# Patient Record
Sex: MALE | Race: WHITE | Employment: OTHER | URBAN - METROPOLITAN AREA
[De-identification: names, ages, dates, MRNs, and addresses within clinical notes are randomized per-mention and may not be internally consistent; named-entity substitution may affect disease eponyms.]

---

## 2017-10-03 PROBLEM — I77.9 BILATERAL CAROTID ARTERY DISEASE (HCC): Status: ACTIVE | Noted: 2017-10-03

## 2020-01-21 RX ORDER — SODIUM CHLORIDE 0.9 % (FLUSH) 0.9 %
5-40 SYRINGE (ML) INJECTION EVERY 8 HOURS
Status: CANCELLED | OUTPATIENT
Start: 2020-01-21

## 2020-01-21 RX ORDER — SODIUM CHLORIDE 0.9 % (FLUSH) 0.9 %
5-40 SYRINGE (ML) INJECTION AS NEEDED
Status: CANCELLED | OUTPATIENT
Start: 2020-01-21

## 2020-01-27 ENCOUNTER — ANESTHESIA EVENT (OUTPATIENT)
Dept: SURGERY | Age: 85
End: 2020-01-27
Payer: MEDICARE

## 2020-01-27 NOTE — H&P
Date of Service: 2020-01-20  Work Status:  ?????     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 He is here now because of a cyst on the dorsal aspect of the DIP joint region of his right index finger. The cyst has been present for about 9 months and continues to gradually enlarge. Otherwise it does not hurt, and it has not been ruptured or drained. He has had no locking, triggering or catching of the fingers. The patient does have a bovine aortic valve. He denies diabetes. PMH, SH & ROS:  This form has been filled out, reviewed, signed and placed in the patient's chart. I reviewed pertinent positive and negative responses and tried to associate them with the musculoskeletal problem of the patient. Other positive responses were deferred to the patient's primary care physician. The patient has several positive findings on the review of systems. He does not smoke or drink. He has had various surgeries including prostate removal, hip replacement and open heart surgery that was done at [facility] in [place]. He has high blood pressure, sleep apnea and uses CPAP, heartburn and kidney disease. CURRENT MEDS:  Listed. ALLERGIES:   None known. PE:  The patient is an alert, oriented, elderly, white male accompanied by his wife. Exam of the right wrist and hand shows functional range of motion of the wrist with no joint swelling or effusion. His hand looks normal except for some mild degenerative arthritic changes around the DIP joints and a fairly large 6 mm to 7 mm cyst over the dorsal radial aspect of the distal segment of his index finger with grooving of the nail distal to the cyst.  The cyst has very thin skin. He has active flexion and extension of the DIP joint of the index finger as well as his other digits. He has no triggering or locking of the fingers.   Circulation is good with brisk capillary refill in the fingertips. Neurologic exam is intact to light touch in the radial, ulnar and median nerve distribution. Skin and nails are normal other than the cyst.     DIAGNOSIS:  Mucous cyst of the right index finger. DISPOSITION:  The problem was discussed with the patient and his wife. I have given him the option of treating this carefully and trying to avoid rupturing it or getting an infection. He is somewhat fearful of infections because of his valve. His other option is surgical excision of the cyst.  He wishes to go ahead with the excision. He will be scheduled for that as an outpatient under an IV regional anesthetic or possibly even a digital block.

## 2020-01-28 ENCOUNTER — HOSPITAL ENCOUNTER (OUTPATIENT)
Age: 85
Setting detail: OUTPATIENT SURGERY
Discharge: HOME | End: 2020-01-28
Attending: ORTHOPAEDIC SURGERY | Admitting: ORTHOPAEDIC SURGERY
Payer: MEDICARE

## 2020-01-28 ENCOUNTER — ANESTHESIA (OUTPATIENT)
Dept: SURGERY | Age: 85
End: 2020-01-28
Payer: MEDICARE

## 2020-01-28 VITALS
HEART RATE: 62 BPM | TEMPERATURE: 98 F | RESPIRATION RATE: 16 BRPM | SYSTOLIC BLOOD PRESSURE: 124 MMHG | OXYGEN SATURATION: 96 % | DIASTOLIC BLOOD PRESSURE: 61 MMHG | WEIGHT: 210 LBS | BODY MASS INDEX: 30.13 KG/M2

## 2020-01-28 DIAGNOSIS — G89.18 POST-OP PAIN: Primary | ICD-10-CM

## 2020-01-28 PROCEDURE — 76210000063 HC OR PH I REC FIRST 0.5 HR: Performed by: ORTHOPAEDIC SURGERY

## 2020-01-28 PROCEDURE — 77030010507 HC ADH SKN DERMBND J&J -B: Performed by: ORTHOPAEDIC SURGERY

## 2020-01-28 PROCEDURE — 76010000160 HC OR TIME 0.5 TO 1 HR INTENSV-TIER 1: Performed by: ORTHOPAEDIC SURGERY

## 2020-01-28 PROCEDURE — 74011250636 HC RX REV CODE- 250/636: Performed by: REGISTERED NURSE

## 2020-01-28 PROCEDURE — 74011000250 HC RX REV CODE- 250: Performed by: ANESTHESIOLOGY

## 2020-01-28 PROCEDURE — 76210000020 HC REC RM PH II FIRST 0.5 HR: Performed by: ORTHOPAEDIC SURGERY

## 2020-01-28 PROCEDURE — 76060000032 HC ANESTHESIA 0.5 TO 1 HR: Performed by: ORTHOPAEDIC SURGERY

## 2020-01-28 PROCEDURE — 77030000032 HC CUF TRNQT ZIMM -B: Performed by: ORTHOPAEDIC SURGERY

## 2020-01-28 PROCEDURE — 77030018836 HC SOL IRR NACL ICUM -A: Performed by: ORTHOPAEDIC SURGERY

## 2020-01-28 PROCEDURE — 74011250636 HC RX REV CODE- 250/636: Performed by: ANESTHESIOLOGY

## 2020-01-28 PROCEDURE — 74011000250 HC RX REV CODE- 250: Performed by: ORTHOPAEDIC SURGERY

## 2020-01-28 PROCEDURE — 74011250636 HC RX REV CODE- 250/636: Performed by: ORTHOPAEDIC SURGERY

## 2020-01-28 PROCEDURE — 77030031139 HC SUT VCRL2 J&J -A: Performed by: ORTHOPAEDIC SURGERY

## 2020-01-28 PROCEDURE — 77030002916 HC SUT ETHLN J&J -A: Performed by: ORTHOPAEDIC SURGERY

## 2020-01-28 RX ORDER — SODIUM CHLORIDE, SODIUM LACTATE, POTASSIUM CHLORIDE, CALCIUM CHLORIDE 600; 310; 30; 20 MG/100ML; MG/100ML; MG/100ML; MG/100ML
100 INJECTION, SOLUTION INTRAVENOUS CONTINUOUS
Status: DISCONTINUED | OUTPATIENT
Start: 2020-01-28 | End: 2020-01-28 | Stop reason: HOSPADM

## 2020-01-28 RX ORDER — LIDOCAINE HYDROCHLORIDE 5 MG/ML
INJECTION, SOLUTION INFILTRATION; INTRAVENOUS AS NEEDED
Status: DISCONTINUED | OUTPATIENT
Start: 2020-01-28 | End: 2020-01-28

## 2020-01-28 RX ORDER — HYDROCODONE BITARTRATE AND ACETAMINOPHEN 7.5; 325 MG/1; MG/1
1 TABLET ORAL
Qty: 20 TAB | Refills: 0 | Status: SHIPPED | OUTPATIENT
Start: 2020-01-28 | End: 2020-02-01

## 2020-01-28 RX ORDER — LIDOCAINE HYDROCHLORIDE 5 MG/ML
INJECTION, SOLUTION INFILTRATION; INTRAVENOUS
Status: COMPLETED | OUTPATIENT
Start: 2020-01-28 | End: 2020-01-28

## 2020-01-28 RX ORDER — OXYCODONE HYDROCHLORIDE 5 MG/1
10 TABLET ORAL
Status: DISCONTINUED | OUTPATIENT
Start: 2020-01-28 | End: 2020-01-28 | Stop reason: HOSPADM

## 2020-01-28 RX ORDER — PROPOFOL 10 MG/ML
INJECTION, EMULSION INTRAVENOUS
Status: DISCONTINUED | OUTPATIENT
Start: 2020-01-28 | End: 2020-01-28 | Stop reason: HOSPADM

## 2020-01-28 RX ORDER — SODIUM CHLORIDE 0.9 % (FLUSH) 0.9 %
5-40 SYRINGE (ML) INJECTION EVERY 8 HOURS
Status: DISCONTINUED | OUTPATIENT
Start: 2020-01-28 | End: 2020-01-28 | Stop reason: HOSPADM

## 2020-01-28 RX ORDER — SODIUM CHLORIDE 0.9 % (FLUSH) 0.9 %
5-40 SYRINGE (ML) INJECTION AS NEEDED
Status: DISCONTINUED | OUTPATIENT
Start: 2020-01-28 | End: 2020-01-28 | Stop reason: HOSPADM

## 2020-01-28 RX ORDER — HYDROMORPHONE HYDROCHLORIDE 2 MG/ML
0.5 INJECTION, SOLUTION INTRAMUSCULAR; INTRAVENOUS; SUBCUTANEOUS
Status: DISCONTINUED | OUTPATIENT
Start: 2020-01-28 | End: 2020-01-28 | Stop reason: HOSPADM

## 2020-01-28 RX ORDER — LIDOCAINE HYDROCHLORIDE AND EPINEPHRINE 10; 10 MG/ML; UG/ML
INJECTION, SOLUTION INFILTRATION; PERINEURAL AS NEEDED
Status: DISCONTINUED | OUTPATIENT
Start: 2020-01-28 | End: 2020-01-28 | Stop reason: HOSPADM

## 2020-01-28 RX ORDER — MIDAZOLAM HYDROCHLORIDE 1 MG/ML
2 INJECTION, SOLUTION INTRAMUSCULAR; INTRAVENOUS
Status: DISCONTINUED | OUTPATIENT
Start: 2020-01-28 | End: 2020-01-28 | Stop reason: HOSPADM

## 2020-01-28 RX ORDER — CEFAZOLIN SODIUM/WATER 2 G/20 ML
2 SYRINGE (ML) INTRAVENOUS ONCE
Status: COMPLETED | OUTPATIENT
Start: 2020-01-28 | End: 2020-01-28

## 2020-01-28 RX ORDER — LIDOCAINE HYDROCHLORIDE 10 MG/ML
0.3 INJECTION INFILTRATION; PERINEURAL ONCE
Status: DISCONTINUED | OUTPATIENT
Start: 2020-01-28 | End: 2020-01-28 | Stop reason: HOSPADM

## 2020-01-28 RX ADMIN — Medication 2 G: at 08:39

## 2020-01-28 RX ADMIN — PROPOFOL 50 MCG/KG/MIN: 10 INJECTION, EMULSION INTRAVENOUS at 08:32

## 2020-01-28 RX ADMIN — SODIUM CHLORIDE, SODIUM LACTATE, POTASSIUM CHLORIDE, AND CALCIUM CHLORIDE 100 ML/HR: 600; 310; 30; 20 INJECTION, SOLUTION INTRAVENOUS at 07:30

## 2020-01-28 RX ADMIN — LIDOCAINE HYDROCHLORIDE 40 ML: 5 INJECTION, SOLUTION INFILTRATION; INTRAVENOUS at 08:36

## 2020-01-28 NOTE — DISCHARGE INSTRUCTIONS
POST OP INSTRUCTIONS      1. Patient has appointment for 2/7/20 at 9:20 AM at the [facility]. 2.  For problems call Dr Lissett Baires, [address] [facility],  [phone]          Appointments only,  [phone]    3. Ice and elevate the surgical site. 4.  May remove dressings in 48 hours and then cover the incision with Band-aids. Keep the stitches dry. Anam Brchristal DIET  · Clear liquids until no nausea or vomiting; then light diet for the first day. · Advance to regular diet on second day, unless your doctor orders otherwise. · If nausea and vomiting continues, call your doctor. PAIN  · Take pain medication as directed by your doctor. · Call your doctor if pain is NOT relieved by medication. CALL YOUR DOCTOR IF   · Excessive bleeding that does not stop after holding pressure over the area  · Temperature of 101 degrees F or above  · Excessive redness, swelling or bruising, and/ or green or yellow, smelly discharge from incision    AFTER ANESTHESIA   · For the first 24 hours: DO NOT Drive, Drink alcoholic beverages, or Make important decisions. · Be aware of dizziness following anesthesia and while taking pain medication.          YOUR DOCTOR'S PHONE NUMBER  [phone]      DISCHARGE SUMMARY from Nurse    PATIENT INSTRUCTIONS:    After general anesthesia or intravenous sedation, for 24 hours or while taking prescription Narcotics:  · Limit your activities  · Do not drive and operate hazardous machinery  · Do not make important personal or business decisions  · Do  not drink alcoholic beverages  · If you have not urinated within 8 hours after discharge, please contact your surgeon on call. *  Please give a list of your current medications to your Primary Care Provider. *  Please update this list whenever your medications are discontinued, doses are      changed, or new medications (including over-the-counter products) are added. *  Please carry medication information at all times in case of emergency situations. These are general instructions for a healthy lifestyle:    No smoking/ No tobacco products/ Avoid exposure to second hand smoke    Surgeon General's Warning:  Quitting smoking now greatly reduces serious risk to your health. Obesity, smoking, and sedentary lifestyle greatly increases your risk for illness    A healthy diet, regular physical exercise & weight monitoring are important for maintaining a healthy lifestyle    You may be retaining fluid if you have a history of heart failure or if you experience any of the following symptoms:  Weight gain of 3 pounds or more overnight or 5 pounds in a week, increased swelling in our hands or feet or shortness of breath while lying flat in bed. Please call your doctor as soon as you notice any of these symptoms; do not wait until your next office visit. Recognize signs and symptoms of STROKE:    F-face looks uneven    A-arms unable to move or move unevenly    S-speech slurred or non-existent    T-time-call 911 as soon as signs and symptoms begin-DO NOT go       Back to bed or wait to see if you get better-TIME IS BRAIN.

## 2020-01-28 NOTE — ANESTHESIA PROCEDURE NOTES
Peripheral Block    Start time: 1/28/2020 8:32 AM  End time: 1/28/2020 8:37 AM  Performed by: Romain Perry CRNA  Authorized by: Jen Pacheco MD       Pre-procedure:    Indications: primary anesthetic    Preanesthetic Checklist: patient identified, risks and benefits discussed, site marked, timeout performed, anesthesia consent given and patient being monitored    Timeout Time: 08:32          Block Type:   Block Type:  Aravind block  Laterality:  Right  Patient Position:  Supine  Block Limb IV Checked: Yes    Esmarch exsanguination: Yes    Tourniquet Type:  Single  Tourniquet Location:  Below elbow  Tourniquet Inflated:  1/28/2020 8:35 AM  Inflation (mmHg):  250  Limb w/o Radial Pulse: Yes    Infused Agent:  Lidocaine 0.5%  Volume Infused (mL):  40    Assessment:    Injection Assessment:   Patient tolerance:  Patient tolerated the procedure well with no immediate complications

## 2020-01-28 NOTE — ANESTHESIA PREPROCEDURE EVALUATION
Anesthetic History               Review of Systems / Medical History  Patient summary reviewed and pertinent labs reviewed    Pulmonary        Sleep apnea: CPAP           Neuro/Psych   Within defined limits           Cardiovascular    Hypertension: well controlled        Dysrhythmias : SVT  CAD    Exercise tolerance: >4 METS  Comments: S/p SVT ablation, AVR/CABG x1 vessel, sees cardiologist regularly, no active cardiac conditions   GI/Hepatic/Renal     GERD: well controlled    Renal disease: CRI  PUD     Endo/Other        Obesity and arthritis     Other Findings              Physical Exam    Airway  Mallampati: II  TM Distance: > 6 cm  Neck ROM: normal range of motion   Mouth opening: Normal     Cardiovascular    Rhythm: regular  Rate: normal         Dental    Dentition: Full lower dentures and Full upper dentures     Pulmonary                 Abdominal  GI exam deferred       Other Findings            Anesthetic Plan    ASA: 3  Anesthesia type: regional - [place] block          Induction: Intravenous  Anesthetic plan and risks discussed with: Patient

## 2020-01-28 NOTE — INTERVAL H&P NOTE
H&P Update: 
Jodie Brunson was seen and examined. Pt is alert and oriented. Chest: Clear w/o SOB. C/V:  RR&R History and physical has been reviewed. The patient has been examined. There have been no significant clinical changes since the completion of the originally dated History and Physical. 
Patient identified by surgeon; surgical site was confirmed by patient and surgeon. The patient is here for excision of a mucus cyst of the right index finger.

## 2020-01-28 NOTE — OP NOTES
OPERATIVE NOTE                    EXCISION OF MUCUS CYST    [facility]         1/28/2020    PREOP DIAGNOSIS:  MUCUS CYST OF THE DIP JOINT OF THE RIGHT INDEX FINGER    POSTOP DIAGNOSIS: SAME    PROCEDURE:  EXCISION OF MUCUS CYST OF THE DIP JOINT OF THE RIGHT    INDEX FINGER    SURGEON:  Bob Monreal MD    ANESTHESIA:  Aravind Block    INDICATIONS:  Vinh Prakash is a 82 y.o. male with a mucus cyst of the right index finger . The planned procedure and alternatives for treatment have been discussed previously. Informed consent has been obtained. The operative site has been marked. PROCEDURAL NOTE:   The patient was brought to the OR and anesthetized for the procedure. The patient was positioned in the supine position and bony prominences and nerves padded or protected. The right upper extremity was prepped and draped as a sterile field. A time out was held with the operative team and the patient, procedure, surgical site and surgeon identified. A dorso-radial T-shaped incision was made over the dorsum of the finger with the transverse limb over the extension crease. The skin flaps were elevated off the underlying bone and extensor tendon. The thin walled cyst was excised and then the joint opened along the ulnar side of the extensor tendon insertion. A small rongeur was used to remove the bony osteophyte that was present over the dorsum of the base of the distal phalanx and dorsal margin of the middle phalanx. The skin flaps were reapproximated with 4-0 nylon stitches. A digital block with 6 cc of 1% lidocaine with epinephrine was done at the A1 pulley level at the base of the finger. A sterile dressing of Xeroform, gauze and jaylene wrapping was applied. The tourniquet was released with good return of color to the fingers. The patient was sent to the recovery room in good condition.     TOURNIQUET TIME:   Total Tourniquet Time Documented:  Forearm (Right) - 21 minutes  Total: Forearm (Right) - 21 minutes      SIGNED: Leona Oliveros MD

## 2020-01-28 NOTE — ANESTHESIA POSTPROCEDURE EVALUATION
Procedure(s):  RIGHT INDEX FINGER EXCISION OF MUCOUS CYST.    regional    Anesthesia Post Evaluation      Multimodal analgesia: multimodal analgesia used between 6 hours prior to anesthesia start to PACU discharge  Patient location during evaluation: PACU  Patient participation: complete - patient participated  Level of consciousness: awake and alert  Pain management: adequate  Airway patency: patent  Anesthetic complications: no  Cardiovascular status: acceptable  Respiratory status: acceptable  Hydration status: acceptable  Post anesthesia nausea and vomiting:  none      Vitals Value Taken Time   /62 1/28/2020  9:13 AM   Temp 36.7 °C (98 °F) 1/28/2020  9:05 AM   Pulse 62 1/28/2020  9:15 AM   Resp 16 1/28/2020  9:13 AM   SpO2 97 % 1/28/2020  9:15 AM   Vitals shown include unvalidated device data.

## 2021-04-29 PROBLEM — G47.10 HYPERSOMNIA, UNSPECIFIED: Status: ACTIVE | Noted: 2021-04-29

## 2021-04-29 PROBLEM — Z78.9 DIFFICULTY USING CONTINUOUS POSITIVE AIRWAY PRESSURE (CPAP) FULL FACE MASK: Status: ACTIVE | Noted: 2021-04-29

## 2022-03-19 PROBLEM — G47.10 HYPERSOMNIA, UNSPECIFIED: Status: ACTIVE | Noted: 2021-04-29

## 2022-03-19 PROBLEM — I77.9 BILATERAL CAROTID ARTERY DISEASE (HCC): Status: ACTIVE | Noted: 2017-10-03

## 2022-03-20 PROBLEM — Z78.9 DIFFICULTY USING CONTINUOUS POSITIVE AIRWAY PRESSURE (CPAP) FULL FACE MASK: Status: ACTIVE | Noted: 2021-04-29

## 2022-06-22 ENCOUNTER — OFFICE VISIT (OUTPATIENT)
Dept: CARDIOLOGY CLINIC | Age: 87
End: 2022-06-22
Payer: MEDICARE

## 2022-06-22 VITALS
BODY MASS INDEX: 29.49 KG/M2 | SYSTOLIC BLOOD PRESSURE: 120 MMHG | HEART RATE: 64 BPM | WEIGHT: 206 LBS | DIASTOLIC BLOOD PRESSURE: 82 MMHG | HEIGHT: 70 IN

## 2022-06-22 DIAGNOSIS — I25.10 CORONARY ARTERY DISEASE INVOLVING NATIVE CORONARY ARTERY OF NATIVE HEART WITHOUT ANGINA PECTORIS: ICD-10-CM

## 2022-06-22 DIAGNOSIS — I65.23 BILATERAL CAROTID ARTERY STENOSIS: ICD-10-CM

## 2022-06-22 DIAGNOSIS — I35.1 NONRHEUMATIC AORTIC VALVE INSUFFICIENCY: ICD-10-CM

## 2022-06-22 DIAGNOSIS — I48.0 PAROXYSMAL ATRIAL FIBRILLATION (HCC): Primary | ICD-10-CM

## 2022-06-22 DIAGNOSIS — I10 BENIGN ESSENTIAL HYPERTENSION: ICD-10-CM

## 2022-06-22 PROCEDURE — 99214 OFFICE O/P EST MOD 30 MIN: CPT | Performed by: INTERNAL MEDICINE

## 2022-06-22 PROCEDURE — G8427 DOCREV CUR MEDS BY ELIG CLIN: HCPCS | Performed by: INTERNAL MEDICINE

## 2022-06-22 PROCEDURE — G8417 CALC BMI ABV UP PARAM F/U: HCPCS | Performed by: INTERNAL MEDICINE

## 2022-06-22 PROCEDURE — 1123F ACP DISCUSS/DSCN MKR DOCD: CPT | Performed by: INTERNAL MEDICINE

## 2022-06-22 PROCEDURE — 1036F TOBACCO NON-USER: CPT | Performed by: INTERNAL MEDICINE

## 2022-06-22 NOTE — PROGRESS NOTES
[address], [address] [employer] [facility], [address] [place] [address]  PHONE: [phone]     22    NAME:  Mikel Jacob  : 1935  MRN: [id number]       SUBJECTIVE:   Mikel Jacob is a 82 y.o. male seen for a follow up visit regarding the following:     Chief Complaint   Patient presents with    Hypertension     6 month follow up       HPI: Here for CAD (Bio AVR, LIMA-LAD, RVOT patch due to wound during surgery, 11 hr surgery in ), SVT ablation , pAF (HAs on tikosyn). Echo 2020: normal EF, normal AVR. No CP, angina, GAONA. Feeling well now. Retired from the  home. Active in home and garden. active, no CP  Needing more rest.  No new GAONA. Patient denies recent history of orthopnea, PND, excessive dizziness and/or syncope. Wearing CPAP every night now. Past Medical History, Past Surgical History, Family history, Social History, and Medications were all reviewed with the patient today and updated as necessary. Current Outpatient Medications   Medication Sig Dispense Refill    ascorbic acid (VITAMIN C) 500 MG tablet Take 500 mg by mouth      aspirin 325 MG tablet Take 325 mg by mouth      fluticasone (CUTIVATE) 0.05 % cream Apply topically 2 times daily      fluticasone (FLONASE) 50 MCG/ACT nasal spray 2 sprays by Nasal route as needed      metoprolol tartrate (LOPRESSOR) 50 MG tablet Take 50 mg by mouth 2 times daily      omeprazole (PRILOSEC) 20 MG delayed release capsule Take 20 mg by mouth      rosuvastatin (CRESTOR) 5 MG tablet Take 5 mg by mouth       No current facility-administered medications for this visit.         Allergies   Allergen Reactions    Pravastatin Other (See Comments)     Muscle and joint pain    Sulfapyridine Rash     Patient Active Problem List    Diagnosis Date Noted    Hypersomnia, unspecified 2021    Difficulty using continuous positive airway pressure (CPAP) full face mask 2021    Bilateral carotid artery disease ([facility] [address] 10/03/2017    S/P CABG (coronary artery bypass graft) 06/28/2016    Aortic valve regurgitation 06/28/2016     Echo 1/2015: normal EF, normal AVR, normal RVOT patch repair        Paroxysmal atrial fibrillation ([address] 06/28/2016     Tikosyn caused HAs  before and after the [facility] surgery 12/14  SVT/AVNRT ablation by V[facility], 5/15        Erectile dysfunction 11/20/2015    Primary localized osteoarthrosis 11/20/2015    Mixed incontinence 11/20/2015    Migraine 11/20/2015    Malignant neoplasm of prostate ([address] 11/20/2015    GERD (gastroesophageal reflux disease) 11/20/2015    Benign essential hypertension 11/20/2015    Obstructive sleep apnea 06/23/2015    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    CAD (coronary artery disease) 04/08/2015    Dyslipidemia 04/08/2015    CKD (chronic kidney disease) stage 3, GFR 30-59 ml/min ([address] 04/08/2015      Past Surgical History:   Procedure Laterality Date    CABG, ARTERY-VEIN, SINGLE  12/2014     along with thoracia aortic aneurysm and AVR    CARDIAC VALVE SURGERY  12/2014    CATARACT REMOVAL Bilateral 2009    with IOL    PROSTATECTOMY      TONSILLECTOMY      TOTAL HIP ARTHROPLASTY Left 2009    UVULOPALATOPHARYGOPLASTY      pt denies     Family History   Problem Relation Age of Onset    No Known Problems Brother     No Known Problems Sister     Parkinson's Disease Father     Lung Disease Brother     Stroke Brother     Cancer Mother         lung    Diabetes Brother      Social History     Tobacco Use    Smoking status: Former Smoker     Packs/day: 1.00    Smokeless tobacco: Never Used    Tobacco comment: Quit smoking: quit 50 years   Substance Use Topics    Alcohol use: No         ROS:    No obvious pertinent positives on review of systems except for what was outlined in the HPI today.       PHYSICAL EXAM:     /82   Pulse 64   Ht 5' 10\" (1.778 m)   Wt 206 lb (93.4 kg)   BMI 29.56 kg/m²    General/Constitutional: Alert and oriented x 3, no acute distress  HEENT:   normocephalic, atraumatic, moist mucous membranes  Neck:   No JVD or carotid bruits bilaterally  Cardiovascular:   regular rate and rhythm, no murmur/rub/gallop appreciated  Pulmonary:   clear to auscultation bilaterally, no respiratory distress  Abdomen:   soft, non-tender, non-distended  Ext:   No sig LE edema bilaterally  Skin:  warm and dry, no obvious rashes seen  Neuro:   no obvious sensory or motor deficits  Psychiatric:   normal mood and affect      Lab Results   Component Value Date     01/31/2022    K 4.8 01/31/2022     01/31/2022    CO2 23 01/31/2022    BUN 19 01/31/2022    CREATININE 1.54 01/31/2022    GLUCOSE 110 01/31/2022    CALCIUM 9.8 01/31/2022        Lab Results   Component Value Date    WBC 5.4 01/31/2022    HGB 15.0 01/31/2022    HCT 43.9 01/31/2022    MCV 91 01/31/2022     (L) 01/31/2022       No results found for: TSHFT4, TSH    No results found for: LABA1C  No results found for: EAG    Lab Results   Component Value Date    CHOL 160 01/31/2022    CHOL 148 01/22/2021    CHOL 152 01/03/2020     Lab Results   Component Value Date    TRIG 131 01/31/2022    TRIG 146 01/22/2021    TRIG 171 (H) 01/03/2020     Lab Results   Component Value Date    HDL 32 (L) 01/31/2022    HDL 30 (L) 01/22/2021    HDL 30 (L) 01/03/2020     Lab Results   Component Value Date    LDLCALC 104 (H) 01/31/2022    LDLCALC 92 01/22/2021    LDLCALC 88 01/03/2020     Lab Results   Component Value Date    LABVLDL 34 01/03/2020    VLDL 24 01/31/2022    VLDL 26 01/22/2021     No results found for: CHOLHDLRATIO        I have Independently reviewed prior care notes, any ER records available, cardiac testing, labs and results with the patient and before seeing the patient today. Also independently reviewed outside records when available. ASSESSMENT:    Christian Cruz was seen today for hypertension.     Diagnoses and all orders for this visit:    Paroxysmal atrial fibrillation ([facility] [address]    Coronary artery disease involving native coronary artery of native heart without angina pectoris    Benign essential hypertension    Bilateral carotid artery stenosis    Nonrheumatic aortic valve insufficiency          PLAN:      1. CAD:  Doing well, working, no angina. Remain on ASA and statin. The patient has been instructed to call with any angina or equivalent as reviewed today. All questions were answered with the patient voicing complete understanding. 2. AVR:   Remain on ASA 81. Follow echo in 1-2 yrs. 3. PAF/SVT:  pAF was questionable dx in the past, prior SVT. Reviewed with EP, Dr. Radha Mcfarland, in the past and plan on just ASA for now. Call for more palp. 4.Carotid Dz: no more testing needed now. Follow. ON ASA, statin. 5. HPL:  On  crestor. Labs in August.     Patient has been instructed and agrees to call our office with any issues or other concerns related to their cardiac condition(s) and/or complaint(s). Return in about 6 months (around 12/22/2022).        NOEL JORGENSEN, DO  6/22/2022

## 2022-08-02 ENCOUNTER — OFFICE VISIT (OUTPATIENT)
Dept: INTERNAL MEDICINE CLINIC | Facility: CLINIC | Age: 87
End: 2022-08-02
Payer: MEDICARE

## 2022-08-02 VITALS
BODY MASS INDEX: 29.26 KG/M2 | TEMPERATURE: 97.1 F | RESPIRATION RATE: 16 BRPM | SYSTOLIC BLOOD PRESSURE: 134 MMHG | DIASTOLIC BLOOD PRESSURE: 75 MMHG | WEIGHT: 204.4 LBS | HEIGHT: 70 IN | HEART RATE: 66 BPM

## 2022-08-02 DIAGNOSIS — C61 MALIGNANT NEOPLASM OF PROSTATE (HCC): Primary | ICD-10-CM

## 2022-08-02 DIAGNOSIS — E78.5 DYSLIPIDEMIA: ICD-10-CM

## 2022-08-02 DIAGNOSIS — I10 BENIGN ESSENTIAL HYPERTENSION: ICD-10-CM

## 2022-08-02 DIAGNOSIS — N18.31 STAGE 3A CHRONIC KIDNEY DISEASE (HCC): ICD-10-CM

## 2022-08-02 DIAGNOSIS — I25.10 CORONARY ARTERY DISEASE INVOLVING NATIVE CORONARY ARTERY OF NATIVE HEART WITHOUT ANGINA PECTORIS: ICD-10-CM

## 2022-08-02 DIAGNOSIS — K21.9 GASTROESOPHAGEAL REFLUX DISEASE WITHOUT ESOPHAGITIS: ICD-10-CM

## 2022-08-02 DIAGNOSIS — I35.1 NONRHEUMATIC AORTIC VALVE INSUFFICIENCY: ICD-10-CM

## 2022-08-02 DIAGNOSIS — M54.2 NECK PAIN: ICD-10-CM

## 2022-08-02 DIAGNOSIS — G47.33 OBSTRUCTIVE SLEEP APNEA: ICD-10-CM

## 2022-08-02 DIAGNOSIS — I48.0 PAROXYSMAL ATRIAL FIBRILLATION (HCC): ICD-10-CM

## 2022-08-02 DIAGNOSIS — N39.46 MIXED INCONTINENCE: ICD-10-CM

## 2022-08-02 PROBLEM — Z78.9 DIFFICULTY USING CONTINUOUS POSITIVE AIRWAY PRESSURE (CPAP) FULL FACE MASK: Status: RESOLVED | Noted: 2021-04-29 | Resolved: 2022-08-02

## 2022-08-02 PROBLEM — G47.10 HYPERSOMNIA, UNSPECIFIED: Status: RESOLVED | Noted: 2021-04-29 | Resolved: 2022-08-02

## 2022-08-02 PROCEDURE — 1123F ACP DISCUSS/DSCN MKR DOCD: CPT | Performed by: INTERNAL MEDICINE

## 2022-08-02 PROCEDURE — G8417 CALC BMI ABV UP PARAM F/U: HCPCS | Performed by: INTERNAL MEDICINE

## 2022-08-02 PROCEDURE — 99214 OFFICE O/P EST MOD 30 MIN: CPT | Performed by: INTERNAL MEDICINE

## 2022-08-02 PROCEDURE — G8427 DOCREV CUR MEDS BY ELIG CLIN: HCPCS | Performed by: INTERNAL MEDICINE

## 2022-08-02 PROCEDURE — 1036F TOBACCO NON-USER: CPT | Performed by: INTERNAL MEDICINE

## 2022-08-02 RX ORDER — ACETAMINOPHEN 500 MG
500 TABLET ORAL EVERY 6 HOURS PRN
COMMUNITY

## 2022-08-02 RX ORDER — ACETAMINOPHEN 160 MG
TABLET,DISINTEGRATING ORAL DAILY
COMMUNITY

## 2022-08-02 SDOH — ECONOMIC STABILITY: FOOD INSECURITY: WITHIN THE PAST 12 MONTHS, THE FOOD YOU BOUGHT JUST DIDN'T LAST AND YOU DIDN'T HAVE MONEY TO GET MORE.: NEVER TRUE

## 2022-08-02 SDOH — ECONOMIC STABILITY: FOOD INSECURITY: WITHIN THE PAST 12 MONTHS, YOU WORRIED THAT YOUR FOOD WOULD RUN OUT BEFORE YOU GOT MONEY TO BUY MORE.: NEVER TRUE

## 2022-08-02 ASSESSMENT — PATIENT HEALTH QUESTIONNAIRE - PHQ9
2. FEELING DOWN, DEPRESSED OR HOPELESS: 0
SUM OF ALL RESPONSES TO PHQ9 QUESTIONS 1 & 2: 0
SUM OF ALL RESPONSES TO PHQ QUESTIONS 1-9: 0
1. LITTLE INTEREST OR PLEASURE IN DOING THINGS: 0
SUM OF ALL RESPONSES TO PHQ QUESTIONS 1-9: 0

## 2022-08-02 ASSESSMENT — ENCOUNTER SYMPTOMS
RESPIRATORY NEGATIVE: 1
ABDOMINAL PAIN: 0

## 2022-08-02 ASSESSMENT — SOCIAL DETERMINANTS OF HEALTH (SDOH): HOW HARD IS IT FOR YOU TO PAY FOR THE VERY BASICS LIKE FOOD, HOUSING, MEDICAL CARE, AND HEATING?: NOT HARD AT ALL

## 2022-08-02 NOTE — PROGRESS NOTES
8/2/2022 9:49 AM  Location:[facility] [address] INTERNAL MEDICINE  [place]  Patient #:  [id number]  YOB: 1935          YOUR LAST HEMOGLOBIN A1CS:   No results found for: HBA1C, [id number]    YOUR LAST LIPID PROFILE:   Lab Results   Component Value Date/Time    CHOL 160 01/31/2022 09:57 AM    HDL 32 01/31/2022 09:57 AM    VLDL 24 01/31/2022 09:57 AM         Lab Results   Component Value Date/Time    GFRAA 47 01/31/2022 09:57 AM    BUN 19 01/31/2022 09:57 AM     01/31/2022 09:57 AM    K 4.8 01/31/2022 09:57 AM     01/31/2022 09:57 AM    CO2 23 01/31/2022 09:57 AM           History of Present Illness     Chief Complaint   Patient presents with    6 Month Follow-Up     Pt presents to the office today for a 6 month follow-up, fatigue is worse      Hypertension    Neck Pain     His neck feel really tight; when he moves it the neck hurts and gives him a headache     This patient says he is no longer working he feels very fatigued now more than usual.  He denies worsening shortness of breath. He was recently diagnosed with bilateral macular degeneration and has had injection therapy. He is having increasing problems with neck tightness and does not take any medication. He denies any neck injury    Mr. Lina Mancera is a 82 y.o. 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    Chest pain 06/28/2016    pt denies any CP or SOB    Chronic kidney disease, stage 3 (HCC)     hx of increased creatinine, renal cely    CKD (chronic kidney disease) stage 3, GFR 30-59 ml/min ([facility]) 4/8/2015    Coronary atherosclerosis of native coronary vessel 6/28/2016    Digital mucinous cyst of finger of right hand     Disease of lung 6/28/2016    Dyslipidemia 4/8/2015    Dyspnea 6/28/2016    Encounter for long-term (current) use of other high-risk medications 11/20/2015    Exanthem 11/20/2015    Former cigarette smoker     GERD (gastroesophageal reflux disease)     medication    H/O heart artery stent     X1    History of peptic ulcer disease 1990's    History of prostate cancer 10/2009    prostatectomy    [place] (hard of hearing)     bilateral hearing aides    HTN (hypertension) 4/8/2015    Hypercholesteremia      pt denies    Hyperlipidemia      Hypertension     controlled with medication    Migraine 11/20/2015    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    complaint with CPAP    Paroxysmal atrial fibrillation ([address] 6/28/2016    Primary localized osteoarthrosis      S/P aortic valve replacement with bioprosthetic valve 4/8/2015    S/P AVR (aortic valve replacement) 12/2014    [place] - along with thoracic aortic aneurysm repair    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    EF 54% echo 7/2015    Thoracic aortic aneurysm ([address] 06/28/2016    repair     Social History     Socioeconomic History    Marital status:      Spouse name: None    Number of children: None    Years of education: None    Highest education level: None   Tobacco Use    Smoking status: Former     Packs/day: 1.00     Types: Cigarettes    Smokeless tobacco: Never    Tobacco comments:     Quit smoking: quit 50 years   Substance and Sexual Activity    Alcohol use: No    Drug use: No     Social Determinants of Health     Financial Resource Strain: Low Risk     Difficulty of Paying Living Expenses: Not hard at all   Food Insecurity: No Food Insecurity    Worried About Running Out of Food in the Last Year: Never true Ran Out of Food in the Last Year: Never true     Past Surgical History:   Procedure Laterality Date    CABG, ARTERY-VEIN, SINGLE  12/2014     along with thoracia aortic aneurysm and AVR    CARDIAC VALVE SURGERY  12/2014    CATARACT REMOVAL Bilateral 2009    with IOL    PROSTATECTOMY      TONSILLECTOMY      TOTAL HIP ARTHROPLASTY Left 2009    UVULOPALATOPHARYGOPLASTY      pt denies     Current Outpatient Medications   Medication Sig Dispense Refill    Multiple Vitamin (MULTIVITAMIN PO) Take by mouth daily      Cholecalciferol (VITAMIN D3) 50 MCG (2000 UT) CAPS Take by mouth daily      acetaminophen (TYLENOL) 500 MG tablet Take 500 mg by mouth every 6 hours as needed for Pain      ascorbic acid (VITAMIN C) 500 MG tablet Take 500 mg by mouth in the morning. aspirin 325 MG tablet Take 325 mg by mouth in the morning. metoprolol tartrate (LOPRESSOR) 50 MG tablet Take 50 mg by mouth 2 times daily      omeprazole (PRILOSEC) 20 MG delayed release capsule Take 20 mg by mouth in the morning. rosuvastatin (CRESTOR) 5 MG tablet Take 5 mg by mouth Twice a Week       No current facility-administered medications for this visit.      Health Maintenance   Topic Date Due    DTaP/Tdap/Td vaccine (1 - Tdap) Never done    Shingles vaccine (1 of 2) Never done    Prostate Specific Antigen (PSA) Screening or Monitoring  01/22/2022    Annual Wellness Visit (AWV)  01/23/2022    COVID-19 Vaccine (4 - Booster for [place] series) 02/15/2022    Flu vaccine (1) 09/01/2022    Lipids  01/31/2023    Depression Screen  01/31/2023    Pneumococcal 65+ years Vaccine  Completed    Hepatitis A vaccine  Aged Out    Hepatitis B vaccine  Aged Out    Hib vaccine  Aged Out    Meningococcal (ACWY) vaccine  Aged Out     Family History   Problem Relation Age of Onset    No Known Problems Brother     No Known Problems Sister     Parkinson's Disease Father     Lung Disease Brother     Stroke Brother     Cancer Mother         lung    Diabetes Brother Review of Systems  Review of Systems   Constitutional: Negative. HENT:  Positive for hearing loss. Respiratory: Negative. Cardiovascular: Negative. Gastrointestinal:  Negative for abdominal pain. Endocrine: Negative. Genitourinary: Negative. Musculoskeletal:  Positive for neck pain. Skin: Negative. Neurological: Negative. Hematological: Negative. Psychiatric/Behavioral: Negative. BP (!) 148/75 (Site: Left Upper Arm, Position: Sitting, Cuff Size: Large Adult)   Pulse 66   Temp 97.1 °F (36.2 °C) (Temporal)   Resp 16   Ht 5' 10\" (1.778 m)   Wt 204 lb 6.4 oz (92.7 kg)   BMI 29.33 kg/m²       Physical Exam    Physical Exam  Constitutional:       General: He is not in acute distress. Appearance: Normal appearance. He is not ill-appearing. HENT:      Head: Normocephalic and atraumatic. Eyes:      Extraocular Movements: Extraocular movements intact. Pupils: Pupils are equal, round, and reactive to light. Cardiovascular:      Rate and Rhythm: Normal rate and regular rhythm. Pulmonary:      Effort: Pulmonary effort is normal.      Breath sounds: Normal breath sounds. Skin:     General: Skin is warm. Neurological:      Mental Status: He is alert. Motor: No weakness. Psychiatric:         Mood and Affect: Mood normal.         Behavior: Behavior normal.         Thought Content: Thought content normal.         Judgment: Judgment normal.         Assessment & Plan    Encounter Diagnoses   Name Primary?     Malignant neoplasm of prostate (HCC) Yes    Benign essential hypertension     Gastroesophageal reflux disease without esophagitis     Paroxysmal atrial fibrillation (HCC)        Current Outpatient Medications   Medication Sig Dispense Refill    Multiple Vitamin (MULTIVITAMIN PO) Take by mouth daily      Cholecalciferol (VITAMIN D3) 50 MCG (2000 UT) CAPS Take by mouth daily      acetaminophen (TYLENOL) 500 MG tablet Take 500 mg by mouth every 6

## 2022-08-17 NOTE — PROGRESS NOTES
[address] [facility], [address] [facility]   [phone]      Patient Name:  Chanel Khan   YOB: 1935         Office Visit  08/18/22          Chief Complaint   Patient presents with    Sleep Apnea    Follow-up           HISTORY OF PRESENT ILLNESS:    This pleasant gentleman came in today for follow-up visit. Since his last visit, patient has been using his new mask and actually likes his Vitera mask. He is currently on CPAP at a pressure of 5-15 cm H2O with C-Flex of 1 and he has excellent compliance and use machine 364 3 and 65 days, 363 days or more than 4 hours average hours 10 hours and 2 minutes. AHI is down to 3.3. Mean pressure 7.695 percentile pressure is 10.8. Mean airleak is 4.6 L/min 95% air leak is 39.9 L/min. Patient currently reports that the machine still starts at a pressure of 4 and is wondering if we can adjust it. He states he feels like he clearly needs more pressure. He also reports sometimes when the pressure gets too high it is uncomfortable and wants to know if we can lower down the maximum pressure as well. Patient still reports he gets a little tired in the mornings, but he indicates that he has to wake up every 2-3 hours to go to the bathroom, since he does not have a prostate. He indicated in 2009 he had total prostatectomy by Dr. Areli Alvarez at [facility] and unfortunately since then he has been dribbling. He indicates he wears diapers and cannot function without it. He tries to get up and urinate and goes right back to bed but unfortunate this makes rather fragmented sleep. In the past he has tried some Aleve and indicates he does sleep a little longer. He has not tried any other agents for his persistent bladder in the past.  He also has not seen urology since then.   His [place] score is 4/24       Sleep Medicine 8/18/2022 4/29/2021   Sitting and reading 1 2   Watching TV 2 2   Sitting, inactive in a public place (e.g. a theatre or a meeting) 0 0   As a passenger in a car for an hour without a break 0 0   Lying down to rest in the afternoon when circumstances permit 0 0   Sitting and talking to someone 0 0   Sitting quietly after a lunch without alcohol 1 2   In a car, while stopped for a few minutes in traffic 0 0   Total score 4 6              DIAGNOSTIC TESTS:                           Past Medical History:        Diagnosis  Date           Aortic valve regurgitation  06/28/2016       Arrhythmia  6/2015          hx of SVT with ablation           Aspirin long-term use         CAD (coronary artery disease)            Followed by [facility]           Candida albicans infection  12/18/2015       Carotid artery stenosis without cerebral infarction  7/12/2016       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  6/23/2015       Chest pain  06/28/2016          pt denies any CP or SOB           Chronic kidney disease, stage 3 ([address]            hx of increased creatinine, renal insuff           CKD (chronic kidney disease) stage 3, GFR 30-59 ml/min ([facility]  4/8/2015       Coronary atherosclerosis of native coronary vessel  6/28/2016       Digital mucinous cyst of finger of right hand         Dyslipidemia  4/8/2015       Dyspnea  6/28/2016       Encounter for long-term (current) use of other high-risk medications  11/20/2015       Exanthem; [address] (2/18/2010)  11/20/2015       Former cigarette smoker         GERD (gastroesophageal reflux disease)            medication           H/O heart artery stent            X1           History of peptic ulcer disease  1990's       History of prostate cancer  10/2009          prostatectomy           [place] (hard of hearing)            bilateral hearing aides           HTN (hypertension)  4/8/2015       Hypercholesteremia             pt denies           Hyperlipidemia; CONTROLLED          Hypertension            controlled with medication           Lobe nodule; Disease of lung  6/28/2016       Migraine  11/20/2015       Mixed incontinence; urge and stress, male and female             due to prostatectomy           Onychomycosis  12/18/2015       BRIDGET (obstructive sleep apnea)  06/23/2015          complaint with CPAP           Paroxysmal atrial fibrillation ([facility]  6/28/2016       Primary localized osteoarthrosis          S/P aortic valve replacement with bioprosthetic valve  4/8/2015       S/P AVR (aortic valve replacement)  12/2014          [place] - along with thoracic aortic aneurysm repair           S/P CABG x 1  12/2014          EF 54% echo 7/2015           Thoracic aortic aneurysm ([address]  06/28/2016          repair                   Problem List   Date Reviewed:  4/29/2021                          Codes  Class  Noted             Difficulty using continuous positive airway pressure (CPAP) full face mask  ICD-10-CM: Z78.9   ICD-9-CM: V49.89    4/29/2021                       Hypersomnia, unspecified  ICD-10-CM: G47.10   ICD-9-CM: 780.54    4/29/2021                       Bilateral carotid artery disease ([facility]  ICD-10-CM: I77.9   ICD-9-CM: 447.9    10/3/2017                       Paroxysmal atrial fibrillation ([facility]  ICD-10-CM: I48.0   ICD-9-CM: 427.31    6/28/2016          Overview Signed 6/28/2016  2:46 PM by Massiel Metzger caused HAs   before and after the [facility] surgery 12/14   SVT/AVNRT ablation by V[facility], 5/15                                      S/P CABG (coronary artery bypass graft)  ICD-10-CM: Z95.1   ICD-9-CM: V45.81    6/28/2016                       Aortic valve regurgitation  ICD-10-CM: I35.1   ICD-9-CM: 424.1    6/28/2016          Overview Signed 6/28/2016  2:46 PM by Susan VIRK               Echo 1/2015: normal EF, normal AVR, normal RVOT patch repair                                      Mixed incontinence; urge and stress, male and female  ICD-10-CM: N39.46   ICD-9-CM: 788.33    11/20/2015                       GERD (gastroesophageal reflux disease);  UNCONTROLLED  ICD-10-CM: K21.9   ICD-9-CM: 530.81    11/20/2015                       Erectile dysfunction, [facility]  ICD-10-CM: N52.9   ICD-9-CM: 607.84    11/20/2015                       Benign essential hypertension  ICD-10-CM: I10   ICD-9-CM: 401.1    11/20/2015                       Malignant neoplasm of prostate ([facility]  ICD-10-CM: C61   ICD-9-CM: 185    11/20/2015                       Migraine  ICD-10-CM: H64.655   ICD-9-CM: 346.90    11/20/2015                       Primary localized osteoarthrosis  ICD-10-CM: M19.91   ICD-9-CM: 715.10    11/20/2015                       Encounter for long-term (current) use of other high-risk medications  ICD-10-CM: Z79.899   ICD-9-CM: V58.69    11/20/2015                       BRIDGET (obstructive sleep apnea)  ICD-10-CM: G47.33   ICD-9-CM: 327.23    6/23/2015                       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  ICD-10-CM: G47.37   ICD-9-CM: 327.27    6/23/2015                       CAD (coronary artery disease)  ICD-10-CM: I25.10   ICD-9-CM: 414.00    4/8/2015                       HTN (hypertension)  ICD-10-CM: I10   ICD-9-CM: 401.9    4/8/2015                       Dyslipidemia  ICD-10-CM: E78.5   ICD-9-CM: 272.4    4/8/2015                       CKD (chronic kidney disease) stage 3, GFR 30-59 ml/min ([facility])  ICD-10-CM: N18.30   ICD-9-CM: 585.3    4/8/2015                                   Past Surgical History:         Procedure  Laterality  Date            HX CATARACT REMOVAL  Bilateral  2009          with IOL            HX HEART VALVE SURGERY    12/2014       HX PROSTATECTOMY           HX TONSILLECTOMY           HX UVULOPALATOPHARYNGOPLASTY              pt denies            HI CABG, ARTERY-VEIN, SINGLE    12/2014           along with thoracia aortic aneurysm and AVR            HI TOTAL HIP ARTHROPLASTY  Left  2009                  Social History            Socioeconomic History           Marital status:   Spouse name:  Not on file           Number of children:  Not on file       Years of education:  Not on file       Highest education level:  Not on file       Occupational History          Not on file       Social Needs           Financial resource strain:  Not on file          Food insecurity              Worry:  Not on file         Inability:  Not on file          Transportation needs              Medical:  Not on file         Non-medical:  Not on file       Tobacco Use           Smoking status:  Former Smoker              Packs/day:  1.00         Years:  15.00         Pack years:  15.00           Smokeless tobacco:  Never Used          Tobacco comment: quit 50 years       Substance and Sexual Activity           Alcohol use:  No       Drug use:  No       Sexual activity:  Not on file       Lifestyle          Physical activity              Days per week:  Not on file         Minutes per session:  Not on file           Stress:  Not on file       Relationships          Social connections              Talks on phone:  Not on file         Gets together:  Not on file         Attends Sikh service:  Not on file         Active member of club or organization:  Not on file         Attends meetings of clubs or organizations:  Not on file         Relationship status:  Not on file          Intimate partner violence              Fear of current or ex partner:  Not on file         Emotionally abused:  Not on file         Physically abused:  Not on file         Forced sexual activity:  Not on file        Other Topics  Concern          Not on file       Social History Narrative          Not on file                  Family History         Problem  Relation  Age of Onset            Cancer  Mother                lung            Diabetes  Brother         Stroke  Brother         Lung Disease  Brother         Parkinson's Disease  Father         No Known Problems  Sister              No Known Problems  Brother Allergies        Allergen  Reactions           Pravastatin  Other (comments)             Muscle and joint pain           Sulfapyridine  Rash             Current Outpatient Medications   Medication Sig Dispense Refill    Multiple Vitamin (MULTIVITAMIN PO) Take by mouth daily      Cholecalciferol (VITAMIN D3) 50 MCG (2000 UT) CAPS Take by mouth daily      acetaminophen (TYLENOL) 500 MG tablet Take 500 mg by mouth every 6 hours as needed for Pain      aspirin 325 MG tablet Take 325 mg by mouth in the morning. metoprolol tartrate (LOPRESSOR) 50 MG tablet Take 50 mg by mouth 2 times daily      omeprazole (PRILOSEC) 20 MG delayed release capsule Take 20 mg by mouth in the morning. rosuvastatin (CRESTOR) 5 MG tablet Take 5 mg by mouth Twice a Week      ascorbic acid (VITAMIN C) 500 MG tablet Take 500 mg by mouth in the morning. (Patient not taking: Reported on 8/18/2022)       No current facility-administered medications for this visit. No current facility-administered medications for this visit. REVIEW OF SYSTEMS:       CONSTITUTIONAL:    There is NEGATIVE  history of fever, chills, night sweats. Patient is  NEGATIVE  for weight loss, they are   NEGATIVE  for  weight gain. Patient is  POSITIVE   for fatigue and hypersomnia and  is  ON    their CPAP. Insomnia  IS NOT    under control. CARDIAC:    No chest pain, pressure, discomfort, palpitations, orthopnea, murmurs, or edema. GI:    No dysphagia, heartburn reflux, nausea/vomiting, diarrhea, abdominal pain, or bleeding. NEURO:     There is no history of AMS, persistent headache, decreased level of consciousness, seizures, or motor or sensory deficits. PHYSICAL EXAM:      Visit Vitals  Vitals:    08/18/22 1606   BP: 126/64   Pulse: 69   Resp: 15   Temp: 97 °F (36.1 °C)   SpO2: 98%       Body mass index is 29.23 kg/m².         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 Nares patent  with right nostril is slightly elevated compared to left   Respiratory: Clear to auscultation bilaterally   Cardiovascular:  RRR without M,G,R   Gastrointestinal: soft and non-tender; with positive bowel sounds. Musculoskeletal: warm without cyanosis. There is no lower leg edema. Skin:  no jaundice or rashes, no wounds    Neurologic: no gross neuro deficits       Psychiatric:  alert and oriented x 3            ASSESSMENT:  (Medical Decision Making)           ICD-10-CM    1. Difficulty using continuous positive airway pressure (CPAP) full face mask  Z78.9 -Doing well with Vitera fullface mask      2. BRIDGET (obstructive sleep apnea)  G47.33 -Patient is compliant and benefiting from CPAP. I did adjust the pressure slightly for him, see below. Renew supplies. 3. Insomnia, unspecified type  G47.00 -Patient does have insomnia and again reiterated to him that I would recommend he take melatonin time-released or extended release 3 mg or 5 mg 1 hour prior to bedtime to see if that helps with sleep consolidation. Part of his problem unfortunately is due to urinary incontinence with his frequent need to urinate. I told him I do not want to give him stronger agents, since it can be associated with feeling groggy or increased risk of fall. They both agreeable with just melatonin      4. Urinary incontinence, unspecified type  R32 -Patient does have incontinence of his urine since he had his prostatectomy in 2009. He has failed Kegel exercises in the past.  May need to see urology. I did consult urology nurse practitioner and recommended possibly considering an anticholinergic medication called Myrbetriq? .  I asked them to see if glycopyrrolate was a good alternative that may be a lot cheaper. They will discuss with urology and get back to me.   Patient is extremely appreciative the time spent discussing this with urology during her visit            PLAN:      -Continue auto CPAP but adjust pressure to 6-15 with C-Flex 1. I did this in the office today   --Patient is compliant and benefiting from auto CPAP   --Supplies Renewed. Continue proper sleep hygiene. Time-released melatonin 3 mg or 5 mg about two 1 hour prior to bedtime. Please note I gave the patient and written instructions again today. See above regarding urology and urinary incontinence      All questions answered for the patient and his wife today. Please note I went over his [place] score and compliance data with him today. Follow up at the [address] [place] [address] will be in 6 months. May put in a urology consult pending return call from them today. Follow up will be with the patient's referring physician as had been previously scheduled. Shai Cardenas MD      Over 50% of today's office visit was spent in face to face time reviewing test results, prognosis, importance of compliance, education about disease process, benefits of  medications, instructions for management of acute flare-ups, and follow up plans. Electronically signed and dictated. Please note if there are errors this is  likely a result of the dictation software.

## 2022-08-18 ENCOUNTER — OFFICE VISIT (OUTPATIENT)
Dept: SLEEP MEDICINE | Age: 87
End: 2022-08-18
Payer: MEDICARE

## 2022-08-18 VITALS
DIASTOLIC BLOOD PRESSURE: 64 MMHG | HEIGHT: 70 IN | BODY MASS INDEX: 29.16 KG/M2 | OXYGEN SATURATION: 98 % | WEIGHT: 203.7 LBS | RESPIRATION RATE: 15 BRPM | HEART RATE: 69 BPM | SYSTOLIC BLOOD PRESSURE: 126 MMHG | TEMPERATURE: 97 F

## 2022-08-18 DIAGNOSIS — Z78.9 DIFFICULTY USING CONTINUOUS POSITIVE AIRWAY PRESSURE (CPAP) FULL FACE MASK: Primary | ICD-10-CM

## 2022-08-18 DIAGNOSIS — G47.33 OSA (OBSTRUCTIVE SLEEP APNEA): ICD-10-CM

## 2022-08-18 DIAGNOSIS — G47.00 INSOMNIA, UNSPECIFIED TYPE: ICD-10-CM

## 2022-08-18 DIAGNOSIS — R32 URINARY INCONTINENCE, UNSPECIFIED TYPE: ICD-10-CM

## 2022-08-18 PROCEDURE — G8427 DOCREV CUR MEDS BY ELIG CLIN: HCPCS | Performed by: INTERNAL MEDICINE

## 2022-08-18 PROCEDURE — G8417 CALC BMI ABV UP PARAM F/U: HCPCS | Performed by: INTERNAL MEDICINE

## 2022-08-18 PROCEDURE — 99214 OFFICE O/P EST MOD 30 MIN: CPT | Performed by: INTERNAL MEDICINE

## 2022-08-18 PROCEDURE — 1036F TOBACCO NON-USER: CPT | Performed by: INTERNAL MEDICINE

## 2022-08-18 PROCEDURE — 1123F ACP DISCUSS/DSCN MKR DOCD: CPT | Performed by: INTERNAL MEDICINE

## 2022-08-18 ASSESSMENT — SLEEP AND FATIGUE QUESTIONNAIRES
ESS TOTAL SCORE: 4
HOW LIKELY ARE YOU TO NOD OFF OR FALL ASLEEP WHILE WATCHING TV: 2
HOW LIKELY ARE YOU TO NOD OFF OR FALL ASLEEP WHILE SITTING INACTIVE IN A PUBLIC PLACE: 0
HOW LIKELY ARE YOU TO NOD OFF OR FALL ASLEEP WHILE SITTING AND READING: 1
HOW LIKELY ARE YOU TO NOD OFF OR FALL ASLEEP WHILE LYING DOWN TO REST IN THE AFTERNOON WHEN CIRCUMSTANCES PERMIT: 0
HOW LIKELY ARE YOU TO NOD OFF OR FALL ASLEEP WHEN YOU ARE A PASSENGER IN A CAR FOR AN HOUR WITHOUT A BREAK: 0
HOW LIKELY ARE YOU TO NOD OFF OR FALL ASLEEP WHILE SITTING AND TALKING TO SOMEONE: 0
HOW LIKELY ARE YOU TO NOD OFF OR FALL ASLEEP WHILE SITTING QUIETLY AFTER LUNCH WITHOUT ALCOHOL: 1
HOW LIKELY ARE YOU TO NOD OFF OR FALL ASLEEP IN A CAR, WHILE STOPPED FOR A FEW MINUTES IN TRAFFIC: 0

## 2022-10-18 ENCOUNTER — TELEPHONE (OUTPATIENT)
Dept: INTERNAL MEDICINE CLINIC | Facility: CLINIC | Age: 87
End: 2022-10-18

## 2022-10-18 DIAGNOSIS — R19.7 DIARRHEA, UNSPECIFIED TYPE: Primary | ICD-10-CM

## 2022-10-18 RX ORDER — DIPHENOXYLATE HYDROCHLORIDE AND ATROPINE SULFATE 2.5; .025 MG/1; MG/1
1 TABLET ORAL 4 TIMES DAILY PRN
Qty: 20 TABLET | Refills: 1 | Status: SHIPPED | OUTPATIENT
Start: 2022-10-18 | End: 2022-10-28

## 2022-12-21 ENCOUNTER — OFFICE VISIT (OUTPATIENT)
Dept: CARDIOLOGY CLINIC | Age: 87
End: 2022-12-21
Payer: MEDICARE

## 2022-12-21 VITALS
BODY MASS INDEX: 28.92 KG/M2 | WEIGHT: 202 LBS | HEIGHT: 70 IN | HEART RATE: 71 BPM | DIASTOLIC BLOOD PRESSURE: 74 MMHG | SYSTOLIC BLOOD PRESSURE: 136 MMHG

## 2022-12-21 DIAGNOSIS — I48.0 PAROXYSMAL ATRIAL FIBRILLATION (HCC): Primary | ICD-10-CM

## 2022-12-21 DIAGNOSIS — G47.33 OBSTRUCTIVE SLEEP APNEA: ICD-10-CM

## 2022-12-21 DIAGNOSIS — I35.1 NONRHEUMATIC AORTIC VALVE INSUFFICIENCY: ICD-10-CM

## 2022-12-21 DIAGNOSIS — I65.23 BILATERAL CAROTID ARTERY STENOSIS: ICD-10-CM

## 2022-12-21 DIAGNOSIS — I10 BENIGN ESSENTIAL HYPERTENSION: ICD-10-CM

## 2022-12-21 PROCEDURE — 3074F SYST BP LT 130 MM HG: CPT | Performed by: INTERNAL MEDICINE

## 2022-12-21 PROCEDURE — 1036F TOBACCO NON-USER: CPT | Performed by: INTERNAL MEDICINE

## 2022-12-21 PROCEDURE — G8484 FLU IMMUNIZE NO ADMIN: HCPCS | Performed by: INTERNAL MEDICINE

## 2022-12-21 PROCEDURE — G8417 CALC BMI ABV UP PARAM F/U: HCPCS | Performed by: INTERNAL MEDICINE

## 2022-12-21 PROCEDURE — 3078F DIAST BP <80 MM HG: CPT | Performed by: INTERNAL MEDICINE

## 2022-12-21 PROCEDURE — G8427 DOCREV CUR MEDS BY ELIG CLIN: HCPCS | Performed by: INTERNAL MEDICINE

## 2022-12-21 PROCEDURE — 1123F ACP DISCUSS/DSCN MKR DOCD: CPT | Performed by: INTERNAL MEDICINE

## 2022-12-21 PROCEDURE — 93000 ELECTROCARDIOGRAM COMPLETE: CPT | Performed by: INTERNAL MEDICINE

## 2022-12-21 PROCEDURE — 99214 OFFICE O/P EST MOD 30 MIN: CPT | Performed by: INTERNAL MEDICINE

## 2022-12-21 RX ORDER — ASCORBIC ACID 500 MG
500 TABLET ORAL DAILY
COMMUNITY

## 2022-12-21 NOTE — PROGRESS NOTES
[address], [address] [facility], [address] [place] [address]  PHONE: [phone]     22    NAME:  Nathalia Hutchins  : 1935  MRN: [id number]       SUBJECTIVE:   Nathalia Hutchins is a 82 y.o. male seen for a follow up visit regarding the following:     Chief Complaint   Patient presents with    Atrial Fibrillation     6 month f/u        HPI: Here for CAD (Bio AVR, LIMA-LAD, RVOT patch due to wound during surgery, 11 hr surgery in ), SVT ablation , pAF (HAs on tikosyn). Echo 2020: normal EF, normal AVR. No CP, angina, GAONA. Picking up leaves this fall. Active in home and garden. active, no CP  Needing more rest.  No new GAONA. Patient denies recent history of orthopnea, PND, excessive dizziness and/or syncope. Wearing CPAP every night now. Past Medical History, Past Surgical History, Family history, Social History, and Medications were all reviewed with the patient today and updated as necessary. Current Outpatient Medications   Medication Sig Dispense Refill    vitamin C (ASCORBIC ACID) 500 MG tablet Take 500 mg by mouth daily      Multiple Vitamins-Minerals (PRESERVISION AREDS 2+MULTI VIT PO) Take by mouth      Multiple Vitamin (MULTIVITAMIN PO) Take by mouth daily      acetaminophen (TYLENOL) 500 MG tablet Take 500 mg by mouth every 6 hours as needed for Pain      aspirin 325 MG tablet Take 325 mg by mouth in the morning. metoprolol tartrate (LOPRESSOR) 50 MG tablet Take 50 mg by mouth 2 times daily      omeprazole (PRILOSEC) 20 MG delayed release capsule Take 20 mg by mouth in the morning. rosuvastatin (CRESTOR) 5 MG tablet Take 5 mg by mouth Twice a Week       No current facility-administered medications for this visit.         Allergies   Allergen Reactions    Pravastatin Other (See Comments)     Muscle and joint pain    Sulfapyridine Rash     Patient Active Problem List    Diagnosis Date Noted    Bilateral carotid artery disease [facility] 10/03/2017 S/P CABG (coronary artery bypass graft) 06/28/2016    Aortic valve regurgitation 06/28/2016     Echo 1/2015: normal EF, normal AVR, normal RVOT patch repair        Paroxysmal atrial fibrillation ([facility] 06/28/2016     Tikosyn caused HAs  before and after the [facility] surgery 12/14  SVT/AVNRT ablation by [facility], 5/15        Erectile dysfunction 11/20/2015    Primary localized osteoarthrosis 11/20/2015    Mixed incontinence 11/20/2015    Malignant neoplasm of prostate ([address] 11/20/2015    GERD (gastroesophageal reflux disease) 11/20/2015    Benign essential hypertension 11/20/2015    Obstructive sleep apnea 06/23/2015    CAD (coronary artery disease) 04/08/2015    Dyslipidemia 04/08/2015    CKD (chronic kidney disease) stage 3, GFR 30-59 ml/min ([address] 04/08/2015      Past Surgical History:   Procedure Laterality Date    CABG, ARTERY-VEIN, SINGLE  12/2014     along with thoracia aortic aneurysm and AVR    CARDIAC VALVE SURGERY  12/2014    CATARACT REMOVAL Bilateral 2009    with IOL    PROSTATECTOMY      TONSILLECTOMY      TOTAL HIP ARTHROPLASTY Left 2009    UVULOPALATOPHARYGOPLASTY      pt denies     Family History   Problem Relation Age of Onset    No Known Problems Brother     No Known Problems Sister     Parkinson's Disease Father     Lung Disease Brother     Stroke Brother     Cancer Mother         lung    Diabetes Brother      Social History     Tobacco Use    Smoking status: Former     Packs/day: 1.00     Types: Cigarettes    Smokeless tobacco: Never    Tobacco comments:     Quit smoking: quit 50 years   Substance Use Topics    Alcohol use: No         ROS:    No obvious pertinent positives on review of systems except for what was outlined in the HPI today.       PHYSICAL EXAM:     /74   Pulse 71 Comment: per EKG  Ht 5' 10\" (1.778 m)   Wt 202 lb (91.6 kg)   BMI 28.98 kg/m²    General/Constitutional:   Alert and oriented x 3, no acute distress  HEENT:   normocephalic, atraumatic, moist mucous membranes  Neck:   No JVD or carotid bruits bilaterally  Cardiovascular:   regular rate and rhythm, no murmur/rub/gallop appreciated  Pulmonary:   clear to auscultation bilaterally, no respiratory distress  Abdomen:   soft, non-tender, non-distended  Ext:   No sig LE edema bilaterally  Skin:  warm and dry, no obvious rashes seen  Neuro:   no obvious sensory or motor deficits  Psychiatric:   normal mood and affect      EKG Today and independently reviewed by me: sinus rhythm, normal intervals and non-specific ST/T wave changes. Lab Results   Component Value Date/Time     01/31/2022 09:57 AM    K 4.8 01/31/2022 09:57 AM     01/31/2022 09:57 AM    CO2 23 01/31/2022 09:57 AM    BUN 19 01/31/2022 09:57 AM    CREATININE 1.54 01/31/2022 09:57 AM    GLUCOSE 110 01/31/2022 09:57 AM    CALCIUM 9.8 01/31/2022 09:57 AM        Lab Results   Component Value Date    WBC 5.4 01/31/2022    HGB 15.0 01/31/2022    HCT 43.9 01/31/2022    MCV 91 01/31/2022     (L) 01/31/2022       No results found for: TSHFT4, TSH    No results found for: LABA1C  No results found for: EAG    Lab Results   Component Value Date    CHOL 160 01/31/2022    CHOL 148 01/22/2021    CHOL 152 01/03/2020     Lab Results   Component Value Date    TRIG 131 01/31/2022    TRIG 146 01/22/2021    TRIG 171 (H) 01/03/2020     Lab Results   Component Value Date    HDL 32 (L) 01/31/2022    HDL 30 (L) 01/22/2021    HDL 30 (L) 01/03/2020     Lab Results   Component Value Date    LDLCALC 104 (H) 01/31/2022    LDLCALC 92 01/22/2021    LDLCALC 88 01/03/2020     Lab Results   Component Value Date    LABVLDL 34 01/03/2020    VLDL 24 01/31/2022    VLDL 26 01/22/2021     No results found for: CHOLHDLRATIO        I have Independently reviewed prior care notes, any ER records available, cardiac testing, labs and results with the patient and before seeing the patient today. Also independently reviewed outside records when available.        ASSESSMENT:    Parul Magallanes was seen today for atrial fibrillation. Diagnoses and all orders for this visit:    Paroxysmal atrial fibrillation (HCC)  -     EKG 12 lead    Bilateral carotid artery stenosis    Benign essential hypertension    Nonrheumatic aortic valve insufficiency    Obstructive sleep apnea        PLAN:         1. CAD:  Doing well, working, no angina. Remain on ASA and statin. The patient has been instructed to call with any angina or equivalent as reviewed today. All questions were answered with the patient voicing complete understanding. 2. AVR:   Remain on ASA 81. Follow echo in 1-2 yrs. 3. PAF/SVT:  pAF was questionable dx in the past, prior SVT. Reviewed with EP, Dr. Josseline Bernabe, in the past and plan on just ASA for now. Call for more palp. 4.Carotid Dz: no more testing needed now. Follow. ON ASA, statin. 5. HPL:  On  crestor. Labs in Feb.      6. BRIDGET: ON CPAP        Patient has been instructed and agrees to call our office with any issues or other concerns related to their cardiac condition(s) and/or complaint(s). Return in about 6 months (around 6/21/2023).        NOEL JORGENSEN,   12/21/2022

## 2022-12-22 RX ORDER — METOPROLOL TARTRATE 50 MG/1
50 TABLET, FILM COATED ORAL 2 TIMES DAILY
Qty: 180 TABLET | Refills: 3 | Status: SHIPPED | OUTPATIENT
Start: 2022-12-22

## 2022-12-22 RX ORDER — ROSUVASTATIN CALCIUM 5 MG/1
5 TABLET, COATED ORAL
Qty: 24 TABLET | Refills: 3 | Status: SHIPPED | OUTPATIENT
Start: 2022-12-22

## 2022-12-22 NOTE — TELEPHONE ENCOUNTER
Medication Refill Request      Name of Medication : metoprolol tartrate      Strength of Medication: 50mg      Directions: 2xdaily      30 day or 90 day supply: 90      Preferred Pharmacy: [facility] in [address] [place] [address] For Provider:      Medication Refill Request      Name of Medication : Rosuvastatin      Strength of Medication: 5mg      Directions: take 5mg by mouth twice a week      30 day or 90 day supply: 90      Preferred Pharmacy: [facility] in [address] [place] [address] For Provider:

## 2023-02-02 ENCOUNTER — OFFICE VISIT (OUTPATIENT)
Dept: INTERNAL MEDICINE CLINIC | Facility: CLINIC | Age: 88
End: 2023-02-02
Payer: MEDICARE

## 2023-02-02 VITALS
DIASTOLIC BLOOD PRESSURE: 80 MMHG | BODY MASS INDEX: 28.49 KG/M2 | HEIGHT: 70 IN | TEMPERATURE: 98.1 F | WEIGHT: 199 LBS | OXYGEN SATURATION: 96 % | HEART RATE: 66 BPM | SYSTOLIC BLOOD PRESSURE: 136 MMHG

## 2023-02-02 DIAGNOSIS — R35.0 URINARY FREQUENCY: ICD-10-CM

## 2023-02-02 DIAGNOSIS — G47.33 OBSTRUCTIVE SLEEP APNEA: ICD-10-CM

## 2023-02-02 DIAGNOSIS — E78.5 DYSLIPIDEMIA: ICD-10-CM

## 2023-02-02 DIAGNOSIS — I10 BENIGN ESSENTIAL HYPERTENSION: Primary | ICD-10-CM

## 2023-02-02 DIAGNOSIS — N18.31 STAGE 3A CHRONIC KIDNEY DISEASE (HCC): ICD-10-CM

## 2023-02-02 DIAGNOSIS — I25.10 CORONARY ARTERY DISEASE INVOLVING NATIVE CORONARY ARTERY OF NATIVE HEART WITHOUT ANGINA PECTORIS: ICD-10-CM

## 2023-02-02 DIAGNOSIS — I77.9 DISORDER OF ARTERIES AND ARTERIOLES, UNSPECIFIED (HCC): ICD-10-CM

## 2023-02-02 DIAGNOSIS — I48.0 PAROXYSMAL ATRIAL FIBRILLATION (HCC): ICD-10-CM

## 2023-02-02 DIAGNOSIS — R53.82 CHRONIC FATIGUE, UNSPECIFIED: ICD-10-CM

## 2023-02-02 DIAGNOSIS — C61 MALIGNANT NEOPLASM OF PROSTATE (HCC): ICD-10-CM

## 2023-02-02 LAB
ALBUMIN SERPL-MCNC: 4 G/DL (ref 3.2–4.6)
ALBUMIN/GLOB SERPL: 1.3 (ref 0.4–1.6)
ALP SERPL-CCNC: 58 U/L (ref 50–136)
ALT SERPL-CCNC: 21 U/L (ref 12–65)
ANION GAP SERPL CALC-SCNC: 5 MMOL/L (ref 2–11)
AST SERPL-CCNC: 10 U/L (ref 15–37)
BASOPHILS # BLD: 0 K/UL (ref 0–0.2)
BASOPHILS NFR BLD: 0 % (ref 0–2)
BILIRUB SERPL-MCNC: 0.6 MG/DL (ref 0.2–1.1)
BUN SERPL-MCNC: 15 MG/DL (ref 8–23)
CALCIUM SERPL-MCNC: 9.5 MG/DL (ref 8.3–10.4)
CHLORIDE SERPL-SCNC: 108 MMOL/L (ref 101–110)
CHOLEST SERPL-MCNC: 144 MG/DL
CO2 SERPL-SCNC: 27 MMOL/L (ref 21–32)
CREAT SERPL-MCNC: 1.4 MG/DL (ref 0.8–1.5)
DIFFERENTIAL METHOD BLD: NORMAL
EOSINOPHIL # BLD: 0.1 K/UL (ref 0–0.8)
EOSINOPHIL NFR BLD: 2 % (ref 0.5–7.8)
ERYTHROCYTE [DISTWIDTH] IN BLOOD BY AUTOMATED COUNT: 14.2 % (ref 11.9–14.6)
GLOBULIN SER CALC-MCNC: 3.1 G/DL (ref 2.8–4.5)
GLUCOSE SERPL-MCNC: 109 MG/DL (ref 65–100)
HCT VFR BLD AUTO: 44.5 % (ref 41.1–50.3)
HDLC SERPL-MCNC: 28 MG/DL (ref 40–60)
HDLC SERPL: 5.1
HGB BLD-MCNC: 14.6 G/DL (ref 13.6–17.2)
IMM GRANULOCYTES # BLD AUTO: 0 K/UL (ref 0–0.5)
IMM GRANULOCYTES NFR BLD AUTO: 0 % (ref 0–5)
LDLC SERPL CALC-MCNC: 85.4 MG/DL
LYMPHOCYTES # BLD: 1.2 K/UL (ref 0.5–4.6)
LYMPHOCYTES NFR BLD: 20 % (ref 13–44)
MCH RBC QN AUTO: 31.2 PG (ref 26.1–32.9)
MCHC RBC AUTO-ENTMCNC: 32.8 G/DL (ref 31.4–35)
MCV RBC AUTO: 95.1 FL (ref 82–102)
MONOCYTES # BLD: 0.6 K/UL (ref 0.1–1.3)
MONOCYTES NFR BLD: 9 % (ref 4–12)
NEUTS SEG # BLD: 4 K/UL (ref 1.7–8.2)
NEUTS SEG NFR BLD: 69 % (ref 43–78)
NRBC # BLD: 0 K/UL (ref 0–0.2)
PLATELET # BLD AUTO: 150 K/UL (ref 150–450)
PMV BLD AUTO: 11.8 FL (ref 9.4–12.3)
POTASSIUM SERPL-SCNC: 4.3 MMOL/L (ref 3.5–5.1)
PROT SERPL-MCNC: 7.1 G/DL (ref 6.3–8.2)
PSA SERPL-MCNC: <0.1 NG/ML
RBC # BLD AUTO: 4.68 M/UL (ref 4.23–5.6)
SODIUM SERPL-SCNC: 140 MMOL/L (ref 133–143)
TRIGL SERPL-MCNC: 153 MG/DL (ref 35–150)
VLDLC SERPL CALC-MCNC: 30.6 MG/DL (ref 6–23)
WBC # BLD AUTO: 5.9 K/UL (ref 4.3–11.1)

## 2023-02-02 PROCEDURE — 1123F ACP DISCUSS/DSCN MKR DOCD: CPT | Performed by: INTERNAL MEDICINE

## 2023-02-02 PROCEDURE — 1036F TOBACCO NON-USER: CPT | Performed by: INTERNAL MEDICINE

## 2023-02-02 PROCEDURE — G8417 CALC BMI ABV UP PARAM F/U: HCPCS | Performed by: INTERNAL MEDICINE

## 2023-02-02 PROCEDURE — G8427 DOCREV CUR MEDS BY ELIG CLIN: HCPCS | Performed by: INTERNAL MEDICINE

## 2023-02-02 PROCEDURE — 99214 OFFICE O/P EST MOD 30 MIN: CPT | Performed by: INTERNAL MEDICINE

## 2023-02-02 PROCEDURE — G8484 FLU IMMUNIZE NO ADMIN: HCPCS | Performed by: INTERNAL MEDICINE

## 2023-02-02 PROCEDURE — 3078F DIAST BP <80 MM HG: CPT | Performed by: INTERNAL MEDICINE

## 2023-02-02 PROCEDURE — 3075F SYST BP GE 130 - 139MM HG: CPT | Performed by: INTERNAL MEDICINE

## 2023-02-02 RX ORDER — OXYBUTYNIN CHLORIDE 5 MG/1
5 TABLET, EXTENDED RELEASE ORAL DAILY
Qty: 30 TABLET | Refills: 3 | Status: SHIPPED | OUTPATIENT
Start: 2023-02-02

## 2023-02-02 SDOH — ECONOMIC STABILITY: INCOME INSECURITY: HOW HARD IS IT FOR YOU TO PAY FOR THE VERY BASICS LIKE FOOD, HOUSING, MEDICAL CARE, AND HEATING?: SOMEWHAT HARD

## 2023-02-02 SDOH — ECONOMIC STABILITY: HOUSING INSECURITY
IN THE LAST 12 MONTHS, WAS THERE A TIME WHEN YOU DID NOT HAVE A STEADY PLACE TO SLEEP OR SLEPT IN A SHELTER (INCLUDING NOW)?: NO

## 2023-02-02 SDOH — ECONOMIC STABILITY: FOOD INSECURITY: WITHIN THE PAST 12 MONTHS, THE FOOD YOU BOUGHT JUST DIDN'T LAST AND YOU DIDN'T HAVE MONEY TO GET MORE.: NEVER TRUE

## 2023-02-02 SDOH — ECONOMIC STABILITY: FOOD INSECURITY: WITHIN THE PAST 12 MONTHS, YOU WORRIED THAT YOUR FOOD WOULD RUN OUT BEFORE YOU GOT MONEY TO BUY MORE.: NEVER TRUE

## 2023-02-02 ASSESSMENT — PATIENT HEALTH QUESTIONNAIRE - PHQ9
SUM OF ALL RESPONSES TO PHQ QUESTIONS 1-9: 0
2. FEELING DOWN, DEPRESSED OR HOPELESS: 0
SUM OF ALL RESPONSES TO PHQ QUESTIONS 1-9: 0
1. LITTLE INTEREST OR PLEASURE IN DOING THINGS: 0
SUM OF ALL RESPONSES TO PHQ9 QUESTIONS 1 & 2: 0

## 2023-02-02 NOTE — PROGRESS NOTES
2/2/2023 9:20 AM  Location:[facility] [address] [facility]  [place]  Patient #:  [id number]  YOB: 1935          YOUR LAST HEMOGLOBIN A1CS:   No results found for: HBA1C, [id number]    YOUR LAST LIPID PROFILE:   Lab Results   Component Value Date/Time    CHOL 160 01/31/2022 09:57 AM    HDL 32 01/31/2022 09:57 AM    VLDL 24 01/31/2022 09:57 AM         Lab Results   Component Value Date/Time    GFRAA 47 01/31/2022 09:57 AM    BUN 19 01/31/2022 09:57 AM     01/31/2022 09:57 AM    K 4.8 01/31/2022 09:57 AM     01/31/2022 09:57 AM    CO2 23 01/31/2022 09:57 AM           History of Present Illness     Chief Complaint   Patient presents with    6 Month Follow-Up     6 month follow-up      This patient has continued to be bothered by urinary frequency and incontinence since his prostatectomy many years ago. He has been given Myrbetriq with no real symptom relief. PSA levels have been controlled. He denies any chest pain or palpitations at this time.   Mr. Jodi Maldonado is a 82 y.o. 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    Chest pain 06/28/2016    pt denies any CP or SOB    Chronic kidney disease, stage 3 (HCC)     hx of increased creatinine, renal insuff    CKD (chronic kidney disease) stage 3, GFR 30-59 ml/min ([facility] 4/8/2015    Coronary atherosclerosis of native coronary vessel 6/28/2016    Digital mucinous cyst of finger of right hand     Disease of lung 6/28/2016    Dyslipidemia 4/8/2015    Dyspnea 6/28/2016    Encounter for long-term (current) use of other high-risk medications 11/20/2015    Exanthem 11/20/2015    Former cigarette smoker     GERD (gastroesophageal reflux disease)     medication    H/O heart artery stent     X1    History of peptic ulcer disease 1990's    History of prostate cancer 10/2009    prostatectomy    [place] (hard of hearing)     bilateral hearing aides    HTN (hypertension) 4/8/2015    Hypercholesteremia      pt denies    Hyperlipidemia      Hypertension     controlled with medication    Migraine 11/20/2015    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    complaint with CPAP    Paroxysmal atrial fibrillation ([address] 6/28/2016    Primary localized osteoarthrosis      S/P aortic valve replacement with bioprosthetic valve 4/8/2015    S/P AVR (aortic valve replacement) 12/2014    [facility] - along with thoracic aortic aneurysm repair    S/P CABG (coronary artery bypass graft) 6/28/2016    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    EF 54% echo 7/2015    Thoracic aortic aneurysm 06/28/2016    repair     Social History     Socioeconomic History    Marital status:      Spouse name: None    Number of children: None    Years of education: None    Highest education level: None   Tobacco Use    Smoking status: Former     Packs/day: 1.00     Types: Cigarettes    Smokeless tobacco: Never    Tobacco comments:     Quit smoking: quit 50 years   Substance and Sexual Activity    Alcohol use: No    Drug use: No     Social Determinants of Health     Financial Resource Strain: Medium Risk    Difficulty of Paying Living Expenses: Somewhat hard   Food Insecurity: No Food Insecurity    Worried About Running Out of Food in the Last Year: Never true    Ran Out of Food in the Last Year: Never true   Transportation Needs: Unknown    Lack of Transportation (Non-Medical): No   Housing Stability: Unknown    Unstable Housing in the Last Year: No Past Surgical History:   Procedure Laterality Date    CABG, ARTERY-VEIN, SINGLE  12/2014     along with thoracia aortic aneurysm and AVR    CARDIAC VALVE SURGERY  12/2014    CATARACT REMOVAL Bilateral 2009    with IOL    PROSTATECTOMY      TONSILLECTOMY      TOTAL HIP ARTHROPLASTY Left 2009    UVULOPALATOPHARYGOPLASTY      pt denies     Current Outpatient Medications   Medication Sig Dispense Refill    metoprolol tartrate (LOPRESSOR) 50 MG tablet Take 1 tablet by mouth 2 times daily 180 tablet 3    rosuvastatin (CRESTOR) 5 MG tablet Take 1 tablet by mouth Twice a Week 24 tablet 3    vitamin C (ASCORBIC ACID) 500 MG tablet Take 500 mg by mouth daily      Multiple Vitamins-Minerals (PRESERVISION AREDS 2+MULTI VIT PO) Take by mouth      Multiple Vitamin (MULTIVITAMIN PO) Take by mouth daily      acetaminophen (TYLENOL) 500 MG tablet Take 500 mg by mouth every 6 hours as needed for Pain      aspirin 325 MG tablet Take 325 mg by mouth in the morning. omeprazole (PRILOSEC) 20 MG delayed release capsule Take 20 mg by mouth in the morning. No current facility-administered medications for this visit.      Health Maintenance   Topic Date Due    DTaP/Tdap/Td vaccine (1 - Tdap) Never done    Shingles vaccine (1 of 2) Never done    COVID-19 Vaccine (4 - Booster for Moderna series) 12/10/2021    Prostate Specific Antigen (PSA) Screening or Monitoring  01/22/2022    Annual Wellness Visit (AWV)  05/23/2022    Flu vaccine (1) 08/01/2022    Lipids  01/31/2023    Depression Screen  08/02/2023    Pneumococcal 65+ years Vaccine  Completed    Hepatitis A vaccine  Aged Out    Hib vaccine  Aged Out    Meningococcal (ACWY) vaccine  Aged Out     Family History   Problem Relation Age of Onset    No Known Problems Brother     No Known Problems Sister     Parkinson's Disease Father     Lung Disease Brother     Stroke Brother     Cancer Mother         lung    Diabetes Brother              Review of Systems  Review of Systems    BP (!) 148/68 (Site: Left Upper Arm, Position: Sitting, Cuff Size: Medium Adult) Comment: recheck  Pulse 66   Temp 98.1 °F (36.7 °C) (Temporal)   Ht 5' 10\" (1.778 m)   Wt 199 lb (90.3 kg)   SpO2 96%   BMI 28.55 kg/m²       Physical Exam    Physical Exam  Constitutional:       General: He is not in acute distress. Appearance: Normal appearance. He is not ill-appearing. HENT:      Head: Normocephalic and atraumatic. Eyes:      Extraocular Movements: Extraocular movements intact. Pupils: Pupils are equal, round, and reactive to light. Cardiovascular:      Rate and Rhythm: Normal rate and regular rhythm. Pulmonary:      Effort: Pulmonary effort is normal.      Breath sounds: Normal breath sounds. Skin:     General: Skin is warm. Neurological:      Mental Status: He is alert. Motor: No weakness. Psychiatric:         Mood and Affect: Mood normal.         Behavior: Behavior normal.         Thought Content: Thought content normal.         Judgment: Judgment normal.       Assessment & Plan    Encounter Diagnoses   Name Primary?     Benign essential hypertension Yes    Disorder of arteries and arterioles, unspecified (HCC)     Paroxysmal atrial fibrillation (HCC)     Malignant neoplasm of prostate (HCC)     Stage 3a chronic kidney disease (HCC)     Coronary artery disease involving native coronary artery of native heart without angina pectoris     Obstructive sleep apnea     Dyslipidemia     Chronic fatigue, unspecified        Current Outpatient Medications   Medication Sig Dispense Refill    metoprolol tartrate (LOPRESSOR) 50 MG tablet Take 1 tablet by mouth 2 times daily 180 tablet 3    rosuvastatin (CRESTOR) 5 MG tablet Take 1 tablet by mouth Twice a Week 24 tablet 3    vitamin C (ASCORBIC ACID) 500 MG tablet Take 500 mg by mouth daily      Multiple Vitamins-Minerals (PRESERVISION AREDS 2+MULTI VIT PO) Take by mouth      Multiple Vitamin (MULTIVITAMIN PO) Take by mouth daily      acetaminophen (TYLENOL) 500 MG tablet Take 500 mg by mouth every 6 hours as needed for Pain      aspirin 325 MG tablet Take 325 mg by mouth in the morning. omeprazole (PRILOSEC) 20 MG delayed release capsule Take 20 mg by mouth in the morning. No current facility-administered medications for this visit. Orders Placed This Encounter   Procedures    Comprehensive Metabolic Panel     Standing Status:   Future     Standing Expiration Date:   2/2/2024    CBC with Auto Differential     Standing Status:   Future     Standing Expiration Date:   2/2/2024    Lipid Panel     Standing Status:   Future     Standing Expiration Date:   2/2/2024    PSA, Diagnostic     Standing Status:   Future     Standing Expiration Date:   2/2/2024       There are no discontinued medications. 1. Benign essential hypertension  Controlled    2. Disorder of arteries and arterioles, unspecified ([facility]  Controlled    3. Paroxysmal atrial fibrillation (HCC)  Control followed by cardiology    4. Malignant neoplasm of prostate ([facility]  No recurrence noted over the past few years  - PSA, Diagnostic; Future    5. Stage 3a chronic kidney disease (HCC)  Stable    6. Coronary artery disease involving native coronary artery of native heart without angina pectoris       7. Obstructive sleep apnea  Sees sleep medicine    8. Dyslipidemia     - Lipid Panel; Future    9. Chronic fatigue, unspecified     - Comprehensive Metabolic Panel; Future  - CBC with Auto Differential; Future      No follow-up provider specified.         González Cuellar MD

## 2023-02-03 ENCOUNTER — TELEPHONE (OUTPATIENT)
Dept: INTERNAL MEDICINE CLINIC | Facility: CLINIC | Age: 88
End: 2023-02-03

## 2023-02-17 RX ORDER — FLUTICASONE PROPIONATE 0.05 %
CREAM (GRAM) TOPICAL
Qty: 15 G | Refills: 3 | Status: SHIPPED | OUTPATIENT
Start: 2023-02-17

## 2023-02-20 ENCOUNTER — OFFICE VISIT (OUTPATIENT)
Dept: SLEEP MEDICINE | Age: 88
End: 2023-02-20
Payer: MEDICARE

## 2023-02-20 VITALS
BODY MASS INDEX: 28.77 KG/M2 | SYSTOLIC BLOOD PRESSURE: 142 MMHG | TEMPERATURE: 97.2 F | HEART RATE: 65 BPM | WEIGHT: 201 LBS | HEIGHT: 70 IN | DIASTOLIC BLOOD PRESSURE: 80 MMHG | RESPIRATION RATE: 16 BRPM | OXYGEN SATURATION: 95 %

## 2023-02-20 DIAGNOSIS — Z78.9 DIFFICULTY USING CONTINUOUS POSITIVE AIRWAY PRESSURE (CPAP) FULL FACE MASK: ICD-10-CM

## 2023-02-20 DIAGNOSIS — G47.10 HYPERSOMNIA, UNSPECIFIED: ICD-10-CM

## 2023-02-20 DIAGNOSIS — G47.33 OSA (OBSTRUCTIVE SLEEP APNEA): Primary | ICD-10-CM

## 2023-02-20 PROCEDURE — 1036F TOBACCO NON-USER: CPT | Performed by: PHYSICIAN ASSISTANT

## 2023-02-20 PROCEDURE — G8484 FLU IMMUNIZE NO ADMIN: HCPCS | Performed by: PHYSICIAN ASSISTANT

## 2023-02-20 PROCEDURE — G8427 DOCREV CUR MEDS BY ELIG CLIN: HCPCS | Performed by: PHYSICIAN ASSISTANT

## 2023-02-20 PROCEDURE — G8417 CALC BMI ABV UP PARAM F/U: HCPCS | Performed by: PHYSICIAN ASSISTANT

## 2023-02-20 PROCEDURE — 99214 OFFICE O/P EST MOD 30 MIN: CPT | Performed by: PHYSICIAN ASSISTANT

## 2023-02-20 PROCEDURE — 3079F DIAST BP 80-89 MM HG: CPT | Performed by: PHYSICIAN ASSISTANT

## 2023-02-20 PROCEDURE — 3077F SYST BP >= 140 MM HG: CPT | Performed by: PHYSICIAN ASSISTANT

## 2023-02-20 PROCEDURE — 1123F ACP DISCUSS/DSCN MKR DOCD: CPT | Performed by: PHYSICIAN ASSISTANT

## 2023-02-20 ASSESSMENT — SLEEP AND FATIGUE QUESTIONNAIRES
HOW LIKELY ARE YOU TO NOD OFF OR FALL ASLEEP WHEN YOU ARE A PASSENGER IN A CAR FOR AN HOUR WITHOUT A BREAK: 0
HOW LIKELY ARE YOU TO NOD OFF OR FALL ASLEEP WHILE WATCHING TV: 0
HOW LIKELY ARE YOU TO NOD OFF OR FALL ASLEEP WHILE SITTING INACTIVE IN A PUBLIC PLACE: 0
HOW LIKELY ARE YOU TO NOD OFF OR FALL ASLEEP WHILE SITTING QUIETLY AFTER LUNCH WITHOUT ALCOHOL: 0
HOW LIKELY ARE YOU TO NOD OFF OR FALL ASLEEP IN A CAR, WHILE STOPPED FOR A FEW MINUTES IN TRAFFIC: 0
HOW LIKELY ARE YOU TO NOD OFF OR FALL ASLEEP WHILE LYING DOWN TO REST IN THE AFTERNOON WHEN CIRCUMSTANCES PERMIT: 0
ESS TOTAL SCORE: 1
HOW LIKELY ARE YOU TO NOD OFF OR FALL ASLEEP WHILE SITTING AND READING: 1
HOW LIKELY ARE YOU TO NOD OFF OR FALL ASLEEP WHILE SITTING AND TALKING TO SOMEONE: 0

## 2023-02-20 NOTE — PROGRESS NOTES
[address] [place] [address] Court, [address] [facility]  [phone]    Patient Name:  Shira Harris  YOB: 1935      Office Visit 2/20/2023    CHIEF COMPLAINT:    Chief Complaint   Patient presents with    CPAP/BiPAP     F/U ON PRESSURE CHANGE 6-15    Sleep Apnea         HISTORY OF PRESENT ILLNESS:  Patient is a 83 yo male seen today for follow up of BRIDGET. Pt had a PSG/HST on 4/20/15 with an AHI of 16.3/hr with desaturations to 87%. Pt is on CPAP 6-15 cm H2O. Pt is accompanied by his wife. Pt reports that he is doing ok. He is using a full face mask but it is wearing a sore on the bridge of his nose. He was using band aids but the adhesive has ripped off his skin. He has been off of his CPAP for a few nights to help his face heal. We discussed a mask fit appt and pt is agreeable. His PCP tried him on oxybutynin to help with OAB. It hasn't worked for him. He continues to get up 3-4 times per night to use the restroom. He is typically able to go back to sleep quickly. He reports that his sleep is just fragmented. Pt has good compliance with use 175/181 days with an average use of 9 hrs and 50 mins per night. Pt has a mask leak of 4.7L/min with an AHI of 3.9/hr. Pt is benefiting and tolerating PAP therapy.      Sleep Medicine 2/20/2023 8/18/2022 4/29/2021   Sitting and reading 1 1 2   Watching TV 0 2 2   Sitting, inactive in a public place (e.g. a theatre or a meeting) 0 0 0   As a passenger in a car for an hour without a break 0 0 0   Lying down to rest in the afternoon when circumstances permit 0 0 0   Sitting and talking to someone 0 0 0   Sitting quietly after a lunch without alcohol 0 1 2   In a car, while stopped for a few minutes in traffic 0 0 0   [place] Sleepiness Score 1 4 6         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    Chest pain 06/28/2016    pt denies any CP or SOB    Chronic kidney disease, stage 3 (HCC)     hx of increased creatinine, renal insuff    CKD (chronic kidney disease) stage 3, GFR 30-59 ml/min ([facility]) 4/8/2015    Coronary atherosclerosis of native coronary vessel 6/28/2016    Digital mucinous cyst of finger of right hand     Disease of lung 6/28/2016    Dyslipidemia 4/8/2015    Dyspnea 6/28/2016    Encounter for long-term (current) use of other high-risk medications 11/20/2015    Exanthem 11/20/2015    Former cigarette smoker     GERD (gastroesophageal reflux disease)     medication    H/O heart artery stent     X1    History of peptic ulcer disease 1990's    History of prostate cancer 10/2009    prostatectomy    [place] (hard of hearing)     bilateral hearing aides    HTN (hypertension) 4/8/2015    Hypercholesteremia      pt denies    Hyperlipidemia      Hypertension     controlled with medication    Migraine 11/20/2015    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    complaint with CPAP    Paroxysmal atrial fibrillation ([address] 6/28/2016    Primary localized osteoarthrosis      S/P aortic valve replacement with bioprosthetic valve 4/8/2015    S/P AVR (aortic valve replacement) 12/2014    [place] - along with thoracic aortic aneurysm repair    S/P CABG (coronary artery bypass graft) 6/28/2016    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    EF 54% echo 7/2015    Thoracic aortic aneurysm 06/28/2016    repair         Patient Active Problem List   Diagnosis    Erectile dysfunction    CAD (coronary artery disease)    Obstructive sleep apnea    Dyslipidemia    Primary localized osteoarthrosis    Mixed incontinence    Malignant neoplasm of prostate ([address]    Aortic valve regurgitation    GERD (gastroesophageal reflux disease)    Bilateral carotid artery disease (HCC)    Paroxysmal atrial fibrillation (HCC)    CKD (chronic kidney disease) stage 3, GFR 30-59 ml/min (HCC)    Benign essential hypertension          Past Surgical History:   Procedure Laterality Date    CABG, ARTERY-VEIN, SINGLE  12/2014     along with thoracia aortic aneurysm and AVR    CARDIAC VALVE SURGERY  12/2014    CATARACT REMOVAL Bilateral 2009    with IOL    PROSTATECTOMY      TONSILLECTOMY      TOTAL HIP ARTHROPLASTY Left 2009    UVULOPALATOPHARYGOPLASTY      pt denies           Social History     Socioeconomic History    Marital status:      Spouse name: Not on file    Number of children: Not on file    Years of education: Not on file    Highest education level: Not on file   Occupational History    Not on file   Tobacco Use    Smoking status: Former     Packs/day: 1.00     Types: Cigarettes    Smokeless tobacco: Never    Tobacco comments:     Quit smoking: quit 50 years   Substance and Sexual Activity    Alcohol use: No    Drug use: No    Sexual activity: Not on file   Other Topics Concern    Not on file   Social History Narrative    Not on file     Social Determinants of Health     Financial Resource Strain: Medium Risk    Difficulty of Paying Living Expenses: Somewhat hard   Food Insecurity: No Food Insecurity    Worried About Running Out of Food in the Last Year: Never true    [address] in the Last Year: Never true   Transportation Needs: Unknown    Lack of Transportation (Medical): Not on file    Lack of Transportation (Non-Medical):  No   Physical Activity: Not on file   Stress: Not on file   Social Connections: Not on file   Intimate Partner Violence: Not on file   Housing Stability: Unknown    Unable to Pay for Housing in the Last Year: Not on file    Number of Places Lived in the Last Year: Not on file    Unstable Housing in the Last Year: No         Family History   Problem Relation Age of Onset    No Known Problems Brother     No Known Problems Sister     Parkinson's Disease Father     Lung Disease Brother     Stroke Brother     Cancer Mother         lung    Diabetes Brother          Allergies   Allergen Reactions    Pravastatin Other (See Comments)     Muscle and joint pain    Sulfapyridine Rash         Current Outpatient Medications   Medication Sig    fluticasone (CUTIVATE) 0.05 % cream APPLY TO AFFECTED AREA(S) TOPICALLY TWO TIMES A DAY    oxybutynin (DITROPAN XL) 5 MG extended release tablet Take 1 tablet by mouth daily    metoprolol tartrate (LOPRESSOR) 50 MG tablet Take 1 tablet by mouth 2 times daily    rosuvastatin (CRESTOR) 5 MG tablet Take 1 tablet by mouth Twice a Week    vitamin C (ASCORBIC ACID) 500 MG tablet Take 500 mg by mouth daily    Multiple Vitamins-Minerals (PRESERVISION AREDS 2+MULTI VIT PO) Take by mouth    Multiple Vitamin (MULTIVITAMIN PO) Take by mouth daily    acetaminophen (TYLENOL) 500 MG tablet Take 500 mg by mouth every 6 hours as needed for Pain    aspirin 325 MG tablet Take 325 mg by mouth in the morning. omeprazole (PRILOSEC) 20 MG delayed release capsule Take 20 mg by mouth in the morning. No current facility-administered medications for this visit. REVIEW OF SYSTEMS:   CONSTITUTIONAL:   There is no history of fever, chills, night sweats, weight loss, weight gain, persistent fatigue, or lethargy/hypersomnolence. CARDIAC:   No chest pain, pressure, discomfort, palpitations, orthopnea, murmurs, or edema. GI:   No dysphagia, heartburn reflux, nausea/vomiting, diarrhea, abdominal pain, or bleeding. NEURO:   There is no history of AMS, persistent headache, decreased level of consciousness, seizures, or motor or sensory deficits.       PHYSICAL EXAM:    Vitals:    02/20/23 1142   BP: (!) 142/80   Pulse: 65   Resp: 16   Temp: 97.2 °F (36.2 °C)   SpO2: 95%   Weight: 201 lb (91.2 kg)   Height: 5' 10\" (1.778 m)             GENERAL APPEARANCE:   The patient is normal weight and in no respiratory distress, on RA. HEENT:   PERRL. Conjunctivae unremarkable. Nasal mucosa is without epistaxis, exudate, or polyps. Gums and dentition are unremarkable. NECK/LYMPHATIC:   Symmetrical with no elevation of jugular venous pulsation. Trachea midline. No thyroid enlargement. No cervical adenopathy. LUNGS:   Normal respiratory effort with symmetrical lung expansion. Breath sounds clear. HEART:   There is a regular rate and rhythm. No murmur, rub, or gallop. There is no edema in the lower extremities. ABDOMEN:   Soft and non-tender. No hepatosplenomegaly. Bowel sounds are normal.     NEURO:   The patient is alert and oriented to person, place, and time. Memory appears intact and mood is normal.  No gross sensorimotor deficits are present. ASSESSMENT:  (Medical Decision Making)      Diagnosis Orders   1. BRIDGET (obstructive sleep apnea) -pt is benefiting and tolerating PAP therapy, supplies have been ordered. A mask fit appt has been requested to get him a mask that does not rub on the bridge of his nose. A Comfort Cover may also help get a better seal in the future. Pt's ESS has improved and his energy is better. He is benefiting from Pap therapy. DME - DURABLE MEDICAL EQUIPMENT      2. Difficulty using continuous positive airway pressure (CPAP) full face mask -pt's compliance has improved. He is benefiting and tolerating PAP therapy        3. Hypersomnia, unspecified -improving.              PLAN:      Orders Placed This Encounter   Procedures    DME - [address] [place] [address] [email or web address]     [facility]  Phone: [address] [facility] [address]  [place] [address]  Dept: [phone]      Patient Name: Yoly Payne  : 1935  Gender: male  Address: [address] [place] [address]  [address] [facility] [address] [address]  Patient phone: [phone] (home)       Primary Insurance: Payor: MEDICARE / Plan: MEDICARE PART A AND B / Product Type: *No Product type* /   Subscriber ID: [id number] - (Medicare)      AMB Supply Order  Order Details     DME Location:[facility]-pt needs mask fit for full face mask that doesn't come over the bridge of his nose   Order Date: 2/20/2023   There were no encounter diagnoses. (  X   )Supplies Needed        Machine   (     ) CPAP Unit  (     ) Auto CPAP Unit  (     ) BiLevel Unit  (     ) Auto BiLevel Unit  (     ) ASV   (     ) Bilevel ST      Length of need: 12 months    Pressure:   cmH20  EPR:      Starting Ramp Pressure:   cm H20  Ramp Time: min      Patient had a diagnostic Apnea Hypopnea Index (AHI) of :    *SUPPLIES* Replace all as needed, or per coverage guidelines     Masks Type:  ( x   ) -Full Face Mask (1 per 3 mon)  (  x  ) -Full Mask (1 per month) Interface/Cushion      (  ) -Nasal Mask (1 per 3 mon)  (  ) - Nasal Mask (1 per month) Interface/Cushion  (     ) -Pillow (2 per mon)  (     ) -Vnurdrbxq (1 per 6 mon)            Other Supplies:    (   X  )-Lpnswapc (1 per 6 mon)  (   X  )-Vtmlnz Tubing (1 per 3 mon)  (   X  )- Disposable Filter (2 per mon)  (   x  )-Couqcq Humidifier (1 per year)     ( x    )-Syuriougc (sometimes used with Full Face Mask) (1 per 6 mos)  (    )-Tubing-without heat (1 per 3 mos)  (     )-Non-Disposable Filter (1 per 6 mos)  (  x   )-Water Chamber (1 per 6 mos)  (     )-Humidifier non-heated (1 per 5 yrs)      Signed Date: 2/20/2023  Electronically Signed By: SAIMA Choudhury  Electronically Dated:  2/20/2023     No orders of the defined types were placed in this encounter. Collaborating Physician: Dr. Evangelista Nath    Over 50% of today's office visit was spent in face to face time reviewing test results, prognosis, importance of compliance, education about disease process, benefits of medications, instructions for management of acute flare-ups, and follow up plans.   Total face to face time spent with patient was 32 minutes.         SAIMA Braun  Electronically signed

## 2023-02-20 NOTE — PATIENT INSTRUCTIONS
The company who will be taking care of your CPAP supplies is:     Address: [address] [place] [address] [address], [place], [address] [place] [address]  Phone: [phone] Option #1  Fax: [phone]

## 2023-06-20 ENCOUNTER — OFFICE VISIT (OUTPATIENT)
Age: 88
End: 2023-06-20
Payer: MEDICARE

## 2023-06-20 VITALS
HEIGHT: 70 IN | DIASTOLIC BLOOD PRESSURE: 64 MMHG | WEIGHT: 197.2 LBS | HEART RATE: 66 BPM | SYSTOLIC BLOOD PRESSURE: 148 MMHG | BODY MASS INDEX: 28.23 KG/M2

## 2023-06-20 DIAGNOSIS — I48.0 PAROXYSMAL ATRIAL FIBRILLATION (HCC): Primary | ICD-10-CM

## 2023-06-20 DIAGNOSIS — I25.10 CORONARY ARTERY DISEASE INVOLVING NATIVE CORONARY ARTERY OF NATIVE HEART WITHOUT ANGINA PECTORIS: ICD-10-CM

## 2023-06-20 DIAGNOSIS — I35.1 NONRHEUMATIC AORTIC VALVE INSUFFICIENCY: ICD-10-CM

## 2023-06-20 DIAGNOSIS — I65.23 BILATERAL CAROTID ARTERY STENOSIS: ICD-10-CM

## 2023-06-20 DIAGNOSIS — I10 BENIGN ESSENTIAL HYPERTENSION: ICD-10-CM

## 2023-06-20 PROCEDURE — 99214 OFFICE O/P EST MOD 30 MIN: CPT | Performed by: INTERNAL MEDICINE

## 2023-06-20 PROCEDURE — G8417 CALC BMI ABV UP PARAM F/U: HCPCS | Performed by: INTERNAL MEDICINE

## 2023-06-20 PROCEDURE — 1123F ACP DISCUSS/DSCN MKR DOCD: CPT | Performed by: INTERNAL MEDICINE

## 2023-06-20 PROCEDURE — G8427 DOCREV CUR MEDS BY ELIG CLIN: HCPCS | Performed by: INTERNAL MEDICINE

## 2023-06-20 PROCEDURE — 3078F DIAST BP <80 MM HG: CPT | Performed by: INTERNAL MEDICINE

## 2023-06-20 PROCEDURE — 1036F TOBACCO NON-USER: CPT | Performed by: INTERNAL MEDICINE

## 2023-06-20 PROCEDURE — 3077F SYST BP >= 140 MM HG: CPT | Performed by: INTERNAL MEDICINE

## 2023-06-20 RX ORDER — MULTIVIT-MIN/IRON/FOLIC ACID/K 18-600-40
CAPSULE ORAL 2 TIMES DAILY
COMMUNITY

## 2023-06-20 NOTE — PROGRESS NOTES
this visit:    Paroxysmal atrial fibrillation (HCC)    Coronary artery disease involving native coronary artery of native heart without angina pectoris    Bilateral carotid artery stenosis    Benign essential hypertension    Nonrheumatic aortic valve insufficiency          PLAN:      1. CAD:  Doing well, working, no angina. Remain on ASA and statin. The patient has been instructed to call with any angina or equivalent as reviewed today. All questions were answered with the patient voicing complete understanding. 2. AVR:   Remain on ASA 81. Follow with MD in eyes. Follow echo in 1-2 yrs. 3. PAF/SVT:  pAF was questionable dx in the past, prior SVT. Reviewed with EP, Dr. Kaur Johnson, in the past and plan on just ASA for now. Call for more palp. 4.Carotid Dz: no more testing needed now. Follow. ON ASA, statin. 5. HPL:  On  crestor. LDL 85.       6. BRIDGET: ON CPAP    Patient has been instructed and agrees to call our office with any issues or other concerns related to their cardiac condition(s) and/or complaint(s). Return in about 6 months (around 12/20/2023).        [facility], DO  6/20/2023

## 2023-07-24 NOTE — PROGRESS NOTES
[address] [place] [address]  [phone]    Patient Name:  Asha Cardenas  YOB: 1935      Office Visit 7/25/2023    Chief Complaint   Patient presents with    Follow-up    Sleep Apnea       HISTORY OF PRESENT ILLNESS:    This pleasant gentleman came in today for follow-up visit. Please note he is 82years old and has moderate sleep apnea AHI is 16.3 low sat 87% on polysomnogram 2015 and currently is on AutoPap at a pressure of 6-10 cm H2O with C-Flex of 1 and using a full facemask-return mask. Patient currently reports that he is doing fairly well with his machine. He indicates he is using it from the compliance report 83 out of 90 days 83 days more than 4 hours average sleep 9 hours and 48 minutes. Median pressure 7.7 and the 95th percentile pressure is 9.7 cm H2O. Mean airleak is 6.3 L/min 95 percentile air leak is 36.3 L/min AHI is down to 3.5. Patient indicates since using Pap therapy he is sleeping better. He is feeling less tired in the mornings. His wife agrees. He has also been working on weight loss and he is down 6 pounds since February of this year. The only issue he is having is the mask is irritating the side of his face and he is having a lesion there they appear to be the size of 2 pimples and they are uncomfortable to him. They indicates they are the location where the mask is. Patient indicates he washes the mass all the time and washes his face and currently has even been using some Silvadene cream but also finds it does not help as much. He is wondering if we have any recommendations or if he needs to change the mask.     Currently his [facility] score is down to 4/24       Sleep Medicine 7/25/2023 2/20/2023 8/18/2022 4/29/2021   Sitting and reading 2 1 1 2   Watching TV 2 0 2 2   Sitting, inactive in a public place (e.g. a theatre or a meeting) 0 0 0 0   As a passenger in a car for an hour without a break 0 0 0

## 2023-07-25 ENCOUNTER — OFFICE VISIT (OUTPATIENT)
Dept: SLEEP MEDICINE | Age: 88
End: 2023-07-25
Payer: MEDICARE

## 2023-07-25 VITALS
SYSTOLIC BLOOD PRESSURE: 137 MMHG | RESPIRATION RATE: 14 BRPM | DIASTOLIC BLOOD PRESSURE: 71 MMHG | BODY MASS INDEX: 27.92 KG/M2 | OXYGEN SATURATION: 97 % | HEART RATE: 72 BPM | WEIGHT: 195 LBS | HEIGHT: 70 IN

## 2023-07-25 DIAGNOSIS — R23.3 EASY BRUISING: ICD-10-CM

## 2023-07-25 DIAGNOSIS — L98.9 SKIN LESION: ICD-10-CM

## 2023-07-25 DIAGNOSIS — G47.33 OSA (OBSTRUCTIVE SLEEP APNEA): Primary | ICD-10-CM

## 2023-07-25 DIAGNOSIS — Z78.9 DIFFICULTY USING CONTINUOUS POSITIVE AIRWAY PRESSURE (CPAP) FULL FACE MASK: ICD-10-CM

## 2023-07-25 PROCEDURE — 99214 OFFICE O/P EST MOD 30 MIN: CPT | Performed by: INTERNAL MEDICINE

## 2023-07-25 PROCEDURE — 1123F ACP DISCUSS/DSCN MKR DOCD: CPT | Performed by: INTERNAL MEDICINE

## 2023-07-25 PROCEDURE — G8427 DOCREV CUR MEDS BY ELIG CLIN: HCPCS | Performed by: INTERNAL MEDICINE

## 2023-07-25 PROCEDURE — G8417 CALC BMI ABV UP PARAM F/U: HCPCS | Performed by: INTERNAL MEDICINE

## 2023-07-25 PROCEDURE — 3078F DIAST BP <80 MM HG: CPT | Performed by: INTERNAL MEDICINE

## 2023-07-25 PROCEDURE — 3075F SYST BP GE 130 - 139MM HG: CPT | Performed by: INTERNAL MEDICINE

## 2023-07-25 PROCEDURE — 1036F TOBACCO NON-USER: CPT | Performed by: INTERNAL MEDICINE

## 2023-07-25 RX ORDER — ASPIRIN 81 MG/1
81 TABLET ORAL DAILY
COMMUNITY

## 2023-07-25 ASSESSMENT — SLEEP AND FATIGUE QUESTIONNAIRES
HOW LIKELY ARE YOU TO NOD OFF OR FALL ASLEEP IN A CAR, WHILE STOPPED FOR A FEW MINUTES IN TRAFFIC: 0
HOW LIKELY ARE YOU TO NOD OFF OR FALL ASLEEP WHILE WATCHING TV: 2
HOW LIKELY ARE YOU TO NOD OFF OR FALL ASLEEP WHILE SITTING QUIETLY AFTER LUNCH WITHOUT ALCOHOL: 0
HOW LIKELY ARE YOU TO NOD OFF OR FALL ASLEEP WHILE LYING DOWN TO REST IN THE AFTERNOON WHEN CIRCUMSTANCES PERMIT: 0
HOW LIKELY ARE YOU TO NOD OFF OR FALL ASLEEP WHEN YOU ARE A PASSENGER IN A CAR FOR AN HOUR WITHOUT A BREAK: 0
HOW LIKELY ARE YOU TO NOD OFF OR FALL ASLEEP WHILE SITTING AND TALKING TO SOMEONE: 0
HOW LIKELY ARE YOU TO NOD OFF OR FALL ASLEEP WHILE SITTING INACTIVE IN A PUBLIC PLACE: 0
HOW LIKELY ARE YOU TO NOD OFF OR FALL ASLEEP WHILE SITTING AND READING: 2
ESS TOTAL SCORE: 4

## 2023-08-03 ENCOUNTER — OFFICE VISIT (OUTPATIENT)
Dept: INTERNAL MEDICINE CLINIC | Facility: CLINIC | Age: 88
End: 2023-08-03
Payer: MEDICARE

## 2023-08-03 VITALS
OXYGEN SATURATION: 94 % | SYSTOLIC BLOOD PRESSURE: 135 MMHG | HEIGHT: 70 IN | BODY MASS INDEX: 27.86 KG/M2 | TEMPERATURE: 98.1 F | RESPIRATION RATE: 17 BRPM | HEART RATE: 66 BPM | WEIGHT: 194.6 LBS | DIASTOLIC BLOOD PRESSURE: 85 MMHG

## 2023-08-03 DIAGNOSIS — I65.23 BILATERAL CAROTID ARTERY STENOSIS: ICD-10-CM

## 2023-08-03 DIAGNOSIS — N18.31 STAGE 3A CHRONIC KIDNEY DISEASE (HCC): ICD-10-CM

## 2023-08-03 DIAGNOSIS — I48.0 PAROXYSMAL ATRIAL FIBRILLATION (HCC): ICD-10-CM

## 2023-08-03 DIAGNOSIS — Z23 NEED FOR VACCINATION: ICD-10-CM

## 2023-08-03 DIAGNOSIS — I25.10 CORONARY ARTERY DISEASE INVOLVING NATIVE CORONARY ARTERY OF NATIVE HEART WITHOUT ANGINA PECTORIS: ICD-10-CM

## 2023-08-03 DIAGNOSIS — M79.606 PAIN OF LOWER EXTREMITY, UNSPECIFIED LATERALITY: ICD-10-CM

## 2023-08-03 DIAGNOSIS — E78.5 DYSLIPIDEMIA: ICD-10-CM

## 2023-08-03 DIAGNOSIS — G47.33 OBSTRUCTIVE SLEEP APNEA: ICD-10-CM

## 2023-08-03 DIAGNOSIS — I10 BENIGN ESSENTIAL HYPERTENSION: Primary | ICD-10-CM

## 2023-08-03 DIAGNOSIS — Z79.899 ENCOUNTER FOR LONG-TERM (CURRENT) USE OF MEDICATIONS: ICD-10-CM

## 2023-08-03 LAB
ANION GAP SERPL CALC-SCNC: 4 MMOL/L (ref 2–11)
BUN SERPL-MCNC: 17 MG/DL (ref 8–23)
CALCIUM SERPL-MCNC: 9.4 MG/DL (ref 8.3–10.4)
CHLORIDE SERPL-SCNC: 108 MMOL/L (ref 101–110)
CO2 SERPL-SCNC: 27 MMOL/L (ref 21–32)
CREAT SERPL-MCNC: 1.5 MG/DL (ref 0.8–1.5)
GLUCOSE SERPL-MCNC: 107 MG/DL (ref 65–100)
POTASSIUM SERPL-SCNC: 4.3 MMOL/L (ref 3.5–5.1)
SODIUM SERPL-SCNC: 139 MMOL/L (ref 133–143)

## 2023-08-03 PROCEDURE — 3078F DIAST BP <80 MM HG: CPT | Performed by: INTERNAL MEDICINE

## 2023-08-03 PROCEDURE — 1123F ACP DISCUSS/DSCN MKR DOCD: CPT | Performed by: INTERNAL MEDICINE

## 2023-08-03 PROCEDURE — 99214 OFFICE O/P EST MOD 30 MIN: CPT | Performed by: INTERNAL MEDICINE

## 2023-08-03 PROCEDURE — 3075F SYST BP GE 130 - 139MM HG: CPT | Performed by: INTERNAL MEDICINE

## 2023-08-03 PROCEDURE — 1036F TOBACCO NON-USER: CPT | Performed by: INTERNAL MEDICINE

## 2023-08-03 PROCEDURE — G8417 CALC BMI ABV UP PARAM F/U: HCPCS | Performed by: INTERNAL MEDICINE

## 2023-08-03 PROCEDURE — G8427 DOCREV CUR MEDS BY ELIG CLIN: HCPCS | Performed by: INTERNAL MEDICINE

## 2023-08-03 RX ORDER — CYCLOBENZAPRINE HCL 5 MG
5 TABLET ORAL 3 TIMES DAILY PRN
Qty: 30 TABLET | Refills: 1 | Status: SHIPPED | OUTPATIENT
Start: 2023-08-03 | End: 2023-08-13

## 2023-08-21 ENCOUNTER — OFFICE VISIT (OUTPATIENT)
Dept: NEUROSURGERY | Age: 88
End: 2023-08-21
Payer: MEDICARE

## 2023-08-21 ENCOUNTER — TELEPHONE (OUTPATIENT)
Dept: NEUROSURGERY | Age: 88
End: 2023-08-21

## 2023-08-21 VITALS
WEIGHT: 190 LBS | DIASTOLIC BLOOD PRESSURE: 77 MMHG | OXYGEN SATURATION: 97 % | SYSTOLIC BLOOD PRESSURE: 157 MMHG | BODY MASS INDEX: 27.2 KG/M2 | HEART RATE: 78 BPM | HEIGHT: 70 IN | TEMPERATURE: 97.7 F

## 2023-08-21 DIAGNOSIS — M54.41 ACUTE BILATERAL LOW BACK PAIN WITH RIGHT-SIDED SCIATICA: Primary | ICD-10-CM

## 2023-08-21 PROCEDURE — 1123F ACP DISCUSS/DSCN MKR DOCD: CPT | Performed by: NEUROLOGICAL SURGERY

## 2023-08-21 PROCEDURE — 1036F TOBACCO NON-USER: CPT | Performed by: NEUROLOGICAL SURGERY

## 2023-08-21 PROCEDURE — 3077F SYST BP >= 140 MM HG: CPT | Performed by: NEUROLOGICAL SURGERY

## 2023-08-21 PROCEDURE — 99204 OFFICE O/P NEW MOD 45 MIN: CPT | Performed by: NEUROLOGICAL SURGERY

## 2023-08-21 PROCEDURE — G8427 DOCREV CUR MEDS BY ELIG CLIN: HCPCS | Performed by: NEUROLOGICAL SURGERY

## 2023-08-21 PROCEDURE — G8417 CALC BMI ABV UP PARAM F/U: HCPCS | Performed by: NEUROLOGICAL SURGERY

## 2023-08-21 PROCEDURE — 3078F DIAST BP <80 MM HG: CPT | Performed by: NEUROLOGICAL SURGERY

## 2023-08-21 RX ORDER — DICLOFENAC SODIUM 75 MG/1
75 TABLET, DELAYED RELEASE ORAL 2 TIMES DAILY
Qty: 60 TABLET | Refills: 2 | Status: SHIPPED | OUTPATIENT
Start: 2023-08-21

## 2023-08-21 NOTE — TELEPHONE ENCOUNTER
Patient's wife states that the Diclofenac wasn't sent to the pharmacy. This rx was discussed during patient's visit today.

## 2023-08-21 NOTE — PROGRESS NOTES
History of Present Illness    Patient Words: 82 y.o. This patient is a 82 y.o. male who presents today for evaluation of a 3-week history of low back pain with right-sided sciatica. The patient was trying to mow his lawn and start the lawnmower when the belt broke. He has been several hours on the ground trying to fix the belt but was unsuccessful. When he got up and went into the house he noticed that he had some back pain with right-sided sciatica which is remained for the most part constant over the last 3 weeks. He has had urgent care visits and emergency room visits with injections given but no other treatments at this point. He has not had any investigations other than x-rays which failed to show a fracture but did show degenerative arthritis. No incontinence. No falls but he does use a walker because the right leg does feel weak.   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    Chest pain 06/28/2016    pt denies any CP or SOB    Chronic kidney disease, stage 3 ([facility])     hx of increased creatinine, renal insuff    CKD (chronic kidney disease) stage 3, GFR 30-59 ml/min ([facility]) 4/8/2015    Coronary atherosclerosis of native coronary vessel 6/28/2016    Digital mucinous cyst of finger of right hand     Disease of lung 6/28/2016    Dyslipidemia 4/8/2015    Dyspnea 6/28/2016    Encounter for long-term (current) use of other high-risk medications 11/20/2015    Exanthem 11/20/2015    Former cigarette smoker     GERD (gastroesophageal reflux disease)     medication    H/O heart artery stent     X1    History of peptic ulcer disease 1990's    History of prostate cancer 10/2009

## 2023-09-08 ENCOUNTER — TELEPHONE (OUTPATIENT)
Dept: INTERNAL MEDICINE CLINIC | Facility: CLINIC | Age: 88
End: 2023-09-08

## 2023-09-08 DIAGNOSIS — L98.9 FACIAL LESION: Primary | ICD-10-CM

## 2023-09-08 NOTE — TELEPHONE ENCOUNTER
Patient is wanting to know about a new referral to the dermatologist , he goes to one in [place] but he would like a new one. Stated he does have something on his face he wants to have them look at.      Please advise

## 2023-09-08 NOTE — TELEPHONE ENCOUNTER
Patient called back. He is seeing Dr Celine Jacinto now and does not want to see him any longer. See prior note.

## 2023-10-02 ENCOUNTER — OFFICE VISIT (OUTPATIENT)
Dept: NEUROSURGERY | Age: 88
End: 2023-10-02
Payer: MEDICARE

## 2023-10-02 VITALS
WEIGHT: 193 LBS | SYSTOLIC BLOOD PRESSURE: 138 MMHG | TEMPERATURE: 96.7 F | HEART RATE: 71 BPM | DIASTOLIC BLOOD PRESSURE: 61 MMHG | OXYGEN SATURATION: 96 % | BODY MASS INDEX: 27.63 KG/M2 | HEIGHT: 70 IN

## 2023-10-02 DIAGNOSIS — M54.41 ACUTE BILATERAL LOW BACK PAIN WITH RIGHT-SIDED SCIATICA: Primary | ICD-10-CM

## 2023-10-02 PROCEDURE — G8427 DOCREV CUR MEDS BY ELIG CLIN: HCPCS | Performed by: NEUROLOGICAL SURGERY

## 2023-10-02 PROCEDURE — G8484 FLU IMMUNIZE NO ADMIN: HCPCS | Performed by: NEUROLOGICAL SURGERY

## 2023-10-02 PROCEDURE — 99213 OFFICE O/P EST LOW 20 MIN: CPT | Performed by: NEUROLOGICAL SURGERY

## 2023-10-02 PROCEDURE — G8417 CALC BMI ABV UP PARAM F/U: HCPCS | Performed by: NEUROLOGICAL SURGERY

## 2023-10-02 PROCEDURE — 1036F TOBACCO NON-USER: CPT | Performed by: NEUROLOGICAL SURGERY

## 2023-10-02 PROCEDURE — 1123F ACP DISCUSS/DSCN MKR DOCD: CPT | Performed by: NEUROLOGICAL SURGERY

## 2023-10-02 RX ORDER — TIZANIDINE 2 MG/1
2 TABLET ORAL EVERY 8 HOURS PRN
Qty: 30 TABLET | Refills: 0 | Status: SHIPPED | OUTPATIENT
Start: 2023-10-02 | End: 2023-10-12

## 2023-10-02 NOTE — PROGRESS NOTES
(2/2/2023)    Housing Stability Vital Sign     Unable to Pay for Housing in the Last Year: Not on file     Number of Places Lived in the Last Year: Not on file     Unstable Housing in the Last Year: No       Current Outpatient Medications   Medication Sig Dispense Refill    tiZANidine (ZANAFLEX) 2 MG tablet Take 1 tablet by mouth every 8 hours as needed (Muscle spasm) 30 tablet 0    diclofenac (VOLTAREN) 75 MG EC tablet Take 1 tablet by mouth 2 times daily 60 tablet 2    aspirin 81 MG EC tablet Take 1 tablet by mouth daily      Cholecalciferol (VITAMIN D) 50 MCG (2000 UT) CAPS capsule Take by mouth in the morning and at bedtime      fluticasone (CUTIVATE) 0.05 % cream APPLY TO AFFECTED AREA(S) TOPICALLY TWO TIMES A DAY 15 g 3    metoprolol tartrate (LOPRESSOR) 50 MG tablet Take 1 tablet by mouth 2 times daily 180 tablet 3    rosuvastatin (CRESTOR) 5 MG tablet Take 1 tablet by mouth Twice a Week 24 tablet 3    Multiple Vitamins-Minerals (PRESERVISION AREDS 2+MULTI VIT PO) Take by mouth      Multiple Vitamin (MULTIVITAMIN PO) Take by mouth daily      acetaminophen (TYLENOL) 500 MG tablet Take 1 tablet by mouth every 6 hours as needed for Pain      omeprazole (PRILOSEC) 20 MG delayed release capsule Take 1 capsule by mouth Daily       No current facility-administered medications for this visit.        Patient Active Problem List   Diagnosis    Erectile dysfunction    CAD (coronary artery disease)    BRIDGET (obstructive sleep apnea)    Dyslipidemia    Primary localized osteoarthrosis    Mixed incontinence    Malignant neoplasm of prostate ([address])    Aortic valve regurgitation    GERD (gastroesophageal reflux disease)    Bilateral carotid artery disease (HCC)    Paroxysmal atrial fibrillation (HCC)    CKD (chronic kidney disease) stage 3, GFR 30-59 ml/min (HCC)    Difficulty using continuous positive airway pressure (CPAP) full face mask    Benign essential hypertension    Skin lesion    Encounter for long-term (current) use

## 2023-11-29 RX ORDER — METOPROLOL TARTRATE 50 MG/1
50 TABLET, FILM COATED ORAL 2 TIMES DAILY
Qty: 180 TABLET | Refills: 3 | Status: SHIPPED | OUTPATIENT
Start: 2023-11-29

## 2023-11-29 RX ORDER — ROSUVASTATIN CALCIUM 5 MG/1
TABLET, COATED ORAL
Qty: 24 TABLET | Refills: 3 | Status: SHIPPED | OUTPATIENT
Start: 2023-11-29

## 2023-12-20 PROBLEM — M54.16 LUMBAR RADICULOPATHY: Status: ACTIVE | Noted: 2023-12-20

## 2023-12-21 ENCOUNTER — HOSPITAL ENCOUNTER (OUTPATIENT)
Dept: MRI IMAGING | Age: 88
Discharge: HOME | End: 2023-12-24
Attending: NEUROLOGICAL SURGERY
Payer: MEDICARE

## 2023-12-21 DIAGNOSIS — M54.41 ACUTE BILATERAL LOW BACK PAIN WITH RIGHT-SIDED SCIATICA: ICD-10-CM

## 2023-12-21 DIAGNOSIS — M54.16 LUMBAR RADICULOPATHY: ICD-10-CM

## 2023-12-21 PROCEDURE — 72148 MRI LUMBAR SPINE W/O DYE: CPT

## 2023-12-26 ENCOUNTER — OFFICE VISIT (OUTPATIENT)
Dept: NEUROSURGERY | Age: 88
End: 2023-12-26
Payer: MEDICARE

## 2023-12-26 VITALS
SYSTOLIC BLOOD PRESSURE: 160 MMHG | DIASTOLIC BLOOD PRESSURE: 83 MMHG | HEIGHT: 70 IN | OXYGEN SATURATION: 97 % | BODY MASS INDEX: 27.63 KG/M2 | WEIGHT: 193 LBS | HEART RATE: 75 BPM | TEMPERATURE: 99 F

## 2023-12-26 DIAGNOSIS — M48.062 LUMBAR STENOSIS WITH NEUROGENIC CLAUDICATION: Primary | ICD-10-CM

## 2023-12-26 DIAGNOSIS — M54.41 ACUTE BILATERAL LOW BACK PAIN WITH RIGHT-SIDED SCIATICA: ICD-10-CM

## 2023-12-26 DIAGNOSIS — M54.16 LUMBAR RADICULOPATHY: ICD-10-CM

## 2023-12-26 PROCEDURE — G8427 DOCREV CUR MEDS BY ELIG CLIN: HCPCS | Performed by: NEUROLOGICAL SURGERY

## 2023-12-26 PROCEDURE — 1123F ACP DISCUSS/DSCN MKR DOCD: CPT | Performed by: NEUROLOGICAL SURGERY

## 2023-12-26 PROCEDURE — G8417 CALC BMI ABV UP PARAM F/U: HCPCS | Performed by: NEUROLOGICAL SURGERY

## 2023-12-26 PROCEDURE — 1036F TOBACCO NON-USER: CPT | Performed by: NEUROLOGICAL SURGERY

## 2023-12-26 PROCEDURE — G8484 FLU IMMUNIZE NO ADMIN: HCPCS | Performed by: NEUROLOGICAL SURGERY

## 2023-12-26 PROCEDURE — 99213 OFFICE O/P EST LOW 20 MIN: CPT | Performed by: NEUROLOGICAL SURGERY

## 2023-12-26 NOTE — PROGRESS NOTES
History of Present Illness    Patient Words: 82 y.o. This patient is a 82 y.o. male who presents today for neurosurgical follow-up. 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 This is the likely etiology of his current pain. He still has pain on a scale of 8 out of 10 down the right lower extremity.     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) 4/8/2015    Coronary atherosclerosis of native coronary vessel 6/28/2016    Digital mucinous cyst of finger of right hand     Disease of lung 6/28/2016    Dyslipidemia 4/8/2015    Dyspnea 6/28/2016    Encounter for long-term (current) use of other high-risk medications 11/20/2015    Exanthem 11/20/2015    Former cigarette smoker     GERD (gastroesophageal reflux disease)     medication    H/O heart artery stent     X1    History of peptic ulcer disease 1990's    History of prostate cancer 10/2009    prostatectomy    [place] (hard of hearing)     bilateral hearing aides    HTN (hypertension) 4/8/2015    Hypercholesteremia      pt denies    Hyperlipidemia      Hypertension     controlled with medication    Migraine 11/20/2015    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    complaint with CPAP    Paroxysmal atrial fibrillation

## 2023-12-27 ENCOUNTER — OFFICE VISIT (OUTPATIENT)
Dept: ORTHOPEDIC SURGERY | Age: 88
End: 2023-12-27
Payer: MEDICARE

## 2023-12-27 VITALS — HEIGHT: 70 IN | BODY MASS INDEX: 27.63 KG/M2 | WEIGHT: 193 LBS

## 2023-12-27 DIAGNOSIS — M54.16 LUMBAR RADICULOPATHY: Primary | ICD-10-CM

## 2023-12-27 PROCEDURE — 1123F ACP DISCUSS/DSCN MKR DOCD: CPT | Performed by: PHYSICIAN ASSISTANT

## 2023-12-27 PROCEDURE — G8427 DOCREV CUR MEDS BY ELIG CLIN: HCPCS | Performed by: PHYSICIAN ASSISTANT

## 2023-12-27 PROCEDURE — G8417 CALC BMI ABV UP PARAM F/U: HCPCS | Performed by: PHYSICIAN ASSISTANT

## 2023-12-27 PROCEDURE — 1036F TOBACCO NON-USER: CPT | Performed by: PHYSICIAN ASSISTANT

## 2023-12-27 PROCEDURE — G8484 FLU IMMUNIZE NO ADMIN: HCPCS | Performed by: PHYSICIAN ASSISTANT

## 2023-12-27 PROCEDURE — 99204 OFFICE O/P NEW MOD 45 MIN: CPT | Performed by: PHYSICIAN ASSISTANT

## 2023-12-27 NOTE — PROGRESS NOTES
Name: Damaso Arreguin  YOB: 1935  Gender: male  MRN: [id number]    CC:   Chief Complaint   Patient presents with    New Patient     Low back pain          HPI:   Damaso Arreguin is a 82 y.o. male with a PMHx of CKD among other comorbidities listed below, he is not on any anticoagulation. They present here for evaluation of back pain rating to the right thigh and knee. This has been going on since August.  He underwent 6 weeks of physical therapy, was seen by Dr. Fior Good of the neurosurgery service who ordered an MRI. He subsequently has been referred to us for interventional management. He is taken Tylenol and has undergone a steroid injection intramuscularly in the past, these have not provided meaningful relief.     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    Chest pain 06/28/2016    pt denies any CP or SOB    Chronic kidney disease, stage 3 (HCC)     hx of increased creatinine, renal insuff    CKD (chronic kidney disease) stage 3, GFR 30-59 ml/min ([address]) 4/8/2015    Coronary atherosclerosis of native coronary vessel 6/28/2016    Digital mucinous cyst of finger of right hand     Disease of lung 6/28/2016    Dyslipidemia 4/8/2015    Dyspnea 6/28/2016    Encounter for long-term (current) use of other high-risk medications 11/20/2015    Exanthem 11/20/2015    Former cigarette smoker     GERD (gastroesophageal reflux disease)     medication    H/O heart artery stent     X1    History

## 2024-01-02 ENCOUNTER — OFFICE VISIT (OUTPATIENT)
Dept: ORTHOPEDIC SURGERY | Age: 89
End: 2024-01-02
Payer: MEDICARE

## 2024-01-02 DIAGNOSIS — M54.16 LUMBAR RADICULOPATHY: Primary | ICD-10-CM

## 2024-01-02 PROCEDURE — 64483 NJX AA&/STRD TFRM EPI L/S 1: CPT | Performed by: PHYSICAL MEDICINE & REHABILITATION

## 2024-01-02 RX ORDER — TRIAMCINOLONE ACETONIDE 40 MG/ML
80 INJECTION, SUSPENSION INTRA-ARTICULAR; INTRAMUSCULAR ONCE
Status: COMPLETED | OUTPATIENT
Start: 2024-01-02 | End: 2024-01-02

## 2024-01-02 RX ADMIN — TRIAMCINOLONE ACETONIDE 80 MG: 40 INJECTION, SUSPENSION INTRA-ARTICULAR; INTRAMUSCULAR at 10:47

## 2024-01-02 NOTE — PROGRESS NOTES
Date: 01/02/24   Name: Shady Matt    Pre-Procedural Diagnosis:    Diagnosis Orders   1. Lumbar radiculopathy  FL NERVE BLOCK LUMBOSACRAL 1ST    triamcinolone acetonide (KENALOG-40) injection 80 mg          Procedure: Selective Nerve Root Blocks (Transforaminal) - Single Level    Precautions: [facility] Precautions spine injections: None.  Patient denies any prior sensitivity to steroid, local anesthetic, contrast dye, iodine or shellfish.    The procedure was discussed at length with the patient and informed consent was signed. The patient was placed in a prone position on the fluoroscopy table and the skin was prepped and draped in a routine sterile fashion. The areas to be injected was/were anesthetized with approximately 5 cc of 1% Lidocaine. A 22-gauge 3.5 inch spinal needle was carefully advanced under fluoroscopic guidance to the right L4 transforaminal space(s). At this time 0.25 cc of omnipaque administered. Once proper placement was confirmed, 2 cc of 0.25% Marcaine and 80 mg of Kenalog were injected through the spinal needle at that/each site.     Fluoroscopic guidance was used intermittently over a 10-minute period to insure proper needle placement and patient safety. A hard copy of the fluoroscopic  images has been placed in the patient's chart. The patient was monitored after the procedure and discharged home in stable fashion.     A total of 2 cc of Kenalog were administered during this procedure.    Resume Meds:  Patient to resume asa 81 mg in the AM.        SULLY AMANDA JR, MD  01/02/24

## 2024-01-16 ENCOUNTER — OFFICE VISIT (OUTPATIENT)
Dept: ORTHOPEDIC SURGERY | Age: 89
End: 2024-01-16
Payer: MEDICARE

## 2024-01-16 VITALS — WEIGHT: 193 LBS | BODY MASS INDEX: 27.63 KG/M2 | HEIGHT: 70 IN

## 2024-01-16 DIAGNOSIS — M54.16 LUMBAR RADICULOPATHY: Primary | ICD-10-CM

## 2024-01-16 PROCEDURE — 1123F ACP DISCUSS/DSCN MKR DOCD: CPT | Performed by: PHYSICIAN ASSISTANT

## 2024-01-16 PROCEDURE — G8417 CALC BMI ABV UP PARAM F/U: HCPCS | Performed by: PHYSICIAN ASSISTANT

## 2024-01-16 PROCEDURE — G8484 FLU IMMUNIZE NO ADMIN: HCPCS | Performed by: PHYSICIAN ASSISTANT

## 2024-01-16 PROCEDURE — 99214 OFFICE O/P EST MOD 30 MIN: CPT | Performed by: PHYSICIAN ASSISTANT

## 2024-01-16 PROCEDURE — 1036F TOBACCO NON-USER: CPT | Performed by: PHYSICIAN ASSISTANT

## 2024-01-16 PROCEDURE — G8427 DOCREV CUR MEDS BY ELIG CLIN: HCPCS | Performed by: PHYSICIAN ASSISTANT

## 2024-01-16 NOTE — PROGRESS NOTES
01/16/24        Name: Shady Matt  YOB: 1935  Gender: male  MRN: [id number]    CC: Follow-up       HPI: Shady Matt is a 88 y.o. male who returns for Follow-up         Patient returns the office today for follow-up after injection.  He underwent right L4 selective nerve block with Dr. Mcdaniel in 1/2/2024.  He reports 0% relief for 0 days following the injection.  Continues to have primarily rating pain to the right thigh down to the knee.    History was obtained by patient      Meds/PSH/PMH/FH/SH: This information has been reviewed.      ALLERGIES:   Allergies   Allergen Reactions    Pravastatin Other (See Comments)     Muscle and joint pain    Sulfapyridine Rash              Physical Examination:            Imaging:         MRI Result (most recent):  MRI LUMBAR SPINE WO CONTRAST 12/21/2023    Narrative  History: Lumbago with sciatica, right side; Radiculopathy, lumbar region    Exam: MRI lumbar spine without contrast    Technique: Axial and sagittal T1 and T2-weighted sequences are available for  review.  A sagittal STIR sequence is also available for review.    COMPARISON:  None    FINDINGS: [place] is made of five lumbar vertebrae. There is normal alignment  of the lumbar vertebrae. Diminished disc signal and disc height are noted  throughout the lumbar spine consistent with degenerative disc desiccation no  intrathecal mass is seen. There are no areas of pathologic signal intensity  within the bone marrow to suggest an acute fracture or neoplasm.  The conus  medullaris terminates at the normal level. There are multiple right renal cysts.    L1-L2: Mild posterior disc bulging effaces the anterior thecal sac. There is  mild bilateral neural foraminal narrowing and mild central canal narrowing.    L2-L3: There is posterior disc bulging with extruded disc posteriorly traversing  inferiorly. The disc fragment measures 11 x 5.3 x 17 mm and causes moderate to  severe central canal

## 2024-01-22 ENCOUNTER — OFFICE VISIT (OUTPATIENT)
Dept: ORTHOPEDIC SURGERY | Age: 89
End: 2024-01-22
Payer: MEDICARE

## 2024-01-22 DIAGNOSIS — M54.16 LUMBAR RADICULOPATHY: Primary | ICD-10-CM

## 2024-01-22 PROCEDURE — 62323 NJX INTERLAMINAR LMBR/SAC: CPT | Performed by: PHYSICAL MEDICINE & REHABILITATION

## 2024-01-22 RX ORDER — TRIAMCINOLONE ACETONIDE 40 MG/ML
100 INJECTION, SUSPENSION INTRA-ARTICULAR; INTRAMUSCULAR ONCE
Status: COMPLETED | OUTPATIENT
Start: 2024-01-22 | End: 2024-01-22

## 2024-01-22 RX ADMIN — TRIAMCINOLONE ACETONIDE 100 MG: 40 INJECTION, SUSPENSION INTRA-ARTICULAR; INTRAMUSCULAR at 15:55

## 2024-01-22 NOTE — PROGRESS NOTES
Date: 01/22/24   Name: Shady Matt    Pre-Procedural Diagnosis:    Diagnosis Orders   1. Lumbar radiculopathy  triamcinolone acetonide (KENALOG-40) injection 100 mg    XR INJ SPINE THER SUBST LUM/SAC W IMG          Procedure: Lumbar Epidural Steroid Injection (NIKITA)    Precautions: [facility] Precautions spine injections: None.  Patient denies any prior sensitivity to steroid, local anesthetic, contrast dye, iodine or shellfish.    The procedure was discussed at length with the patient and informed consent was signed. The patient was placed in a prone position on the fluoroscopy table and the skin was prepped and draped in a routine sterile fashion. The area to be injected was anesthetized with approximately 5 cc of 1% Lidocaine. Initially a 22-gauge 3.5 inch spinal needle was carefully advanced under fluoroscopic guidance to the right L4-L5 interlaminar epidural space. At this time 0.25 cc of omnipaque administered.. Once proper placement was confirmed, 1.5 cc of sterile water and 100 mg of Kenalog were injected through the spinal needle.     Fluoroscopic guidance was used intermittently over a 10-minute period to insure proper needle placement and patient safety. A hard copy of the fluoroscopic  images has been placed in the patient's chart. The patient was monitored after the procedure and discharged home without complication.     A total of 2.5 cc of Kenalog were administered during this procedure.    Resume Meds:  N/A    L MELISSA AMANDA JR, MD  01/22/24

## 2024-01-25 NOTE — PROGRESS NOTES
[facility]  [address]  [place] [address]  [phone]    Patient Name:  Shady Matt  YOB: 1935      Office Visit 1/26/2024    CHIEF COMPLAINT:    Chief Complaint   Patient presents with    Sleep Apnea         HISTORY OF PRESENT ILLNESS:  Patient is seen today for follow up of BRIDGET. Patient had a PSG 4/20/15 with AHI 16.3/hr with desaturations to 87%.  His sleep study was ordered after an extensive CABG in Dec 2014 in [place]. He is prescribed APAP 6-10 cm using a full face mask. Download reveals 98% compliance over the past 6 months with average nightly use 8 hrs 10 min. AHI is 4.7/hr with average pressure used 6.7-9.3 cm.  He is having a very high mask leak up to 87 leaks per minute.  He is trying a hybrid fullface mask but has had some previous facial surgeries.  His nares do not fit well in the mask.  He has tried many different masks and continues to have issues with it.  He states he is a mouth breather.  We discussed trying a nasal pillow mask to see if this would fit better and he is interested in trying it.  He is sleeping 8 hours nightly although he is in bed 9 to 10 hours.  He states that he does toss and turn in the night but states that he does this while he is awake.    He tried a fullface triangle mask but this caused some nasal breakdown on his nasal bridge.  His current mask is causing some skin irritation on his cheek.  He has lost about 8 to 10 pounds with a current weight of 191 pounds.    He asks about other options to CPAP.  He has a full upper and lower denture plate and is unable to use an oral appliance.  He is interested in the inspire but will need a repeat sleep study.  His wife states that his snoring is resolved on CPAP therapy. He feels the pressure is too strong at times.     Download        [place] Sleepiness Scale         Past Medical History:   Diagnosis Date    Aortic valve regurgitation 06/28/2016    Arrhythmia 6/2015

## 2024-01-26 ENCOUNTER — OFFICE VISIT (OUTPATIENT)
Dept: SLEEP MEDICINE | Age: 89
End: 2024-01-26
Payer: MEDICARE

## 2024-01-26 VITALS
TEMPERATURE: 97.4 F | OXYGEN SATURATION: 98 % | WEIGHT: 191 LBS | SYSTOLIC BLOOD PRESSURE: 142 MMHG | BODY MASS INDEX: 27.35 KG/M2 | HEART RATE: 71 BPM | HEIGHT: 70 IN | DIASTOLIC BLOOD PRESSURE: 60 MMHG

## 2024-01-26 DIAGNOSIS — G47.33 OSA (OBSTRUCTIVE SLEEP APNEA): Primary | ICD-10-CM

## 2024-01-26 DIAGNOSIS — G47.34 NOCTURNAL HYPOXEMIA: ICD-10-CM

## 2024-01-26 DIAGNOSIS — Z78.9 DIFFICULTY USING CONTINUOUS POSITIVE AIRWAY PRESSURE (CPAP) FULL FACE MASK: ICD-10-CM

## 2024-01-26 PROCEDURE — G8427 DOCREV CUR MEDS BY ELIG CLIN: HCPCS | Performed by: NURSE PRACTITIONER

## 2024-01-26 PROCEDURE — G8484 FLU IMMUNIZE NO ADMIN: HCPCS | Performed by: NURSE PRACTITIONER

## 2024-01-26 PROCEDURE — 1036F TOBACCO NON-USER: CPT | Performed by: NURSE PRACTITIONER

## 2024-01-26 PROCEDURE — G8417 CALC BMI ABV UP PARAM F/U: HCPCS | Performed by: NURSE PRACTITIONER

## 2024-01-26 PROCEDURE — 1123F ACP DISCUSS/DSCN MKR DOCD: CPT | Performed by: NURSE PRACTITIONER

## 2024-01-26 PROCEDURE — 99213 OFFICE O/P EST LOW 20 MIN: CPT | Performed by: NURSE PRACTITIONER

## 2024-01-26 ASSESSMENT — SLEEP AND FATIGUE QUESTIONNAIRES
HOW LIKELY ARE YOU TO NOD OFF OR FALL ASLEEP IN A CAR, WHILE STOPPED FOR A FEW MINUTES IN TRAFFIC: 0
HOW LIKELY ARE YOU TO NOD OFF OR FALL ASLEEP WHEN YOU ARE A PASSENGER IN A CAR FOR AN HOUR WITHOUT A BREAK: 0
HOW LIKELY ARE YOU TO NOD OFF OR FALL ASLEEP WHILE SITTING AND READING: 3
HOW LIKELY ARE YOU TO NOD OFF OR FALL ASLEEP WHILE SITTING AND TALKING TO SOMEONE: 0
HOW LIKELY ARE YOU TO NOD OFF OR FALL ASLEEP WHILE WATCHING TV: 3
ESS TOTAL SCORE: 7
HOW LIKELY ARE YOU TO NOD OFF OR FALL ASLEEP WHILE LYING DOWN TO REST IN THE AFTERNOON WHEN CIRCUMSTANCES PERMIT: 0
HOW LIKELY ARE YOU TO NOD OFF OR FALL ASLEEP WHILE SITTING QUIETLY AFTER LUNCH WITHOUT ALCOHOL: 1
HOW LIKELY ARE YOU TO NOD OFF OR FALL ASLEEP WHILE SITTING INACTIVE IN A PUBLIC PLACE: 0

## 2024-01-30 ENCOUNTER — OFFICE VISIT (OUTPATIENT)
Dept: NEUROSURGERY | Age: 89
End: 2024-01-30
Payer: MEDICARE

## 2024-01-30 VITALS
OXYGEN SATURATION: 97 % | HEART RATE: 71 BPM | TEMPERATURE: 98.1 F | SYSTOLIC BLOOD PRESSURE: 150 MMHG | BODY MASS INDEX: 27.2 KG/M2 | HEIGHT: 70 IN | DIASTOLIC BLOOD PRESSURE: 72 MMHG | WEIGHT: 190 LBS

## 2024-01-30 DIAGNOSIS — M54.41 ACUTE BILATERAL LOW BACK PAIN WITH RIGHT-SIDED SCIATICA: ICD-10-CM

## 2024-01-30 DIAGNOSIS — M54.16 LUMBAR RADICULOPATHY: Primary | ICD-10-CM

## 2024-01-30 DIAGNOSIS — M48.062 LUMBAR STENOSIS WITH NEUROGENIC CLAUDICATION: ICD-10-CM

## 2024-01-30 PROCEDURE — 1123F ACP DISCUSS/DSCN MKR DOCD: CPT | Performed by: NEUROLOGICAL SURGERY

## 2024-01-30 PROCEDURE — 1036F TOBACCO NON-USER: CPT | Performed by: NEUROLOGICAL SURGERY

## 2024-01-30 PROCEDURE — G8417 CALC BMI ABV UP PARAM F/U: HCPCS | Performed by: NEUROLOGICAL SURGERY

## 2024-01-30 PROCEDURE — 99213 OFFICE O/P EST LOW 20 MIN: CPT | Performed by: NEUROLOGICAL SURGERY

## 2024-01-30 PROCEDURE — G8484 FLU IMMUNIZE NO ADMIN: HCPCS | Performed by: NEUROLOGICAL SURGERY

## 2024-01-30 PROCEDURE — G8427 DOCREV CUR MEDS BY ELIG CLIN: HCPCS | Performed by: NEUROLOGICAL SURGERY

## 2024-01-30 RX ORDER — TRAMADOL HYDROCHLORIDE 50 MG/1
50 TABLET ORAL EVERY 6 HOURS PRN
Qty: 28 TABLET | Refills: 0 | Status: SHIPPED | OUTPATIENT
Start: 2024-01-30 | End: 2024-02-06

## 2024-01-30 NOTE — PROGRESS NOTES
History of Present Illness    Patient Words: 88 y.o.     This patient is a 88 y.o. male who presents today for neurosurgical follow-up.  He completed 2 epidural steroid injections with initial improvement in his pain however after several days the pain level has returned to its elevated state.  Therefore he has had physical therapy pain management multiple medications and epidural injections all without definite relief.  MRI scan confirmed severe spinal stenosis L2-L4 with disc protrusions contributing as well.        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    Chest pain 06/28/2016    pt denies any CP or SOB    Chronic kidney disease, stage 3 ([facility])     hx of increased creatinine, renal insuff    CKD (chronic kidney disease) stage 3, GFR 30-59 ml/min ([facility]) 4/8/2015    Coronary atherosclerosis of native coronary vessel 6/28/2016    Digital mucinous cyst of finger of right hand     Disease of lung 6/28/2016    Dyslipidemia 4/8/2015    Dyspnea 6/28/2016    Encounter for long-term (current) use of other high-risk medications 11/20/2015    Exanthem 11/20/2015    Former cigarette smoker     GERD (gastroesophageal reflux disease)     medication    H/O heart artery stent     X1    History of peptic ulcer disease 1990's    History of prostate cancer 10/2009    prostatectomy    [place] (hard of hearing)     bilateral hearing aides    HTN (hypertension) 4/8/2015    Hypercholesteremia      pt denies    Hyperlipidemia      Hypertension     controlled with medication    Migraine 11/20/2015    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

## 2024-02-06 ENCOUNTER — OFFICE VISIT (OUTPATIENT)
Dept: INTERNAL MEDICINE CLINIC | Facility: CLINIC | Age: 89
End: 2024-02-06
Payer: MEDICARE

## 2024-02-06 VITALS
DIASTOLIC BLOOD PRESSURE: 70 MMHG | SYSTOLIC BLOOD PRESSURE: 155 MMHG | HEART RATE: 97 BPM | OXYGEN SATURATION: 95 % | BODY MASS INDEX: 26.63 KG/M2 | WEIGHT: 186 LBS | TEMPERATURE: 97.3 F | HEIGHT: 70 IN | RESPIRATION RATE: 16 BRPM

## 2024-02-06 DIAGNOSIS — I48.0 PAROXYSMAL ATRIAL FIBRILLATION (HCC): ICD-10-CM

## 2024-02-06 DIAGNOSIS — E78.5 DYSLIPIDEMIA: ICD-10-CM

## 2024-02-06 DIAGNOSIS — C61 MALIGNANT NEOPLASM OF PROSTATE (HCC): ICD-10-CM

## 2024-02-06 DIAGNOSIS — I25.10 CORONARY ARTERY DISEASE INVOLVING NATIVE CORONARY ARTERY OF NATIVE HEART WITHOUT ANGINA PECTORIS: Primary | ICD-10-CM

## 2024-02-06 DIAGNOSIS — N18.31 STAGE 3A CHRONIC KIDNEY DISEASE (HCC): ICD-10-CM

## 2024-02-06 DIAGNOSIS — Z00.00 MEDICARE ANNUAL WELLNESS VISIT, SUBSEQUENT: ICD-10-CM

## 2024-02-06 DIAGNOSIS — I77.9 DISORDER OF ARTERIES AND ARTERIOLES, UNSPECIFIED (HCC): ICD-10-CM

## 2024-02-06 DIAGNOSIS — G47.33 OSA (OBSTRUCTIVE SLEEP APNEA): ICD-10-CM

## 2024-02-06 DIAGNOSIS — I10 BENIGN ESSENTIAL HYPERTENSION: ICD-10-CM

## 2024-02-06 DIAGNOSIS — M54.16 LUMBAR RADICULOPATHY: ICD-10-CM

## 2024-02-06 DIAGNOSIS — R53.82 CHRONIC FATIGUE: ICD-10-CM

## 2024-02-06 LAB
ALBUMIN SERPL-MCNC: 4.2 G/DL (ref 3.2–4.6)
ALBUMIN/GLOB SERPL: 1.6 (ref 0.4–1.6)
ALP SERPL-CCNC: 67 U/L (ref 50–136)
ALT SERPL-CCNC: 21 U/L (ref 12–65)
ANION GAP SERPL CALC-SCNC: 5 MMOL/L (ref 2–11)
AST SERPL-CCNC: 11 U/L (ref 15–37)
BASOPHILS # BLD: 0 K/UL (ref 0–0.2)
BASOPHILS NFR BLD: 0 % (ref 0–2)
BILIRUB SERPL-MCNC: 0.8 MG/DL (ref 0.2–1.1)
BUN SERPL-MCNC: 24 MG/DL (ref 8–23)
CALCIUM SERPL-MCNC: 9.9 MG/DL (ref 8.3–10.4)
CHLORIDE SERPL-SCNC: 106 MMOL/L (ref 103–113)
CHOLEST SERPL-MCNC: 139 MG/DL
CO2 SERPL-SCNC: 28 MMOL/L (ref 21–32)
CREAT SERPL-MCNC: 1.4 MG/DL (ref 0.8–1.5)
DIFFERENTIAL METHOD BLD: NORMAL
EOSINOPHIL # BLD: 0.1 K/UL (ref 0–0.8)
EOSINOPHIL NFR BLD: 1 % (ref 0.5–7.8)
ERYTHROCYTE [DISTWIDTH] IN BLOOD BY AUTOMATED COUNT: 14.2 % (ref 11.9–14.6)
GLOBULIN SER CALC-MCNC: 2.7 G/DL (ref 2.8–4.5)
GLUCOSE SERPL-MCNC: 107 MG/DL (ref 65–100)
HCT VFR BLD AUTO: 44.8 % (ref 41.1–50.3)
HDLC SERPL-MCNC: 41 MG/DL (ref 40–60)
HDLC SERPL: 3.4
HGB BLD-MCNC: 14.7 G/DL (ref 13.6–17.2)
IMM GRANULOCYTES # BLD AUTO: 0 K/UL (ref 0–0.5)
IMM GRANULOCYTES NFR BLD AUTO: 0 % (ref 0–5)
LDLC SERPL CALC-MCNC: 83 MG/DL
LYMPHOCYTES # BLD: 1.4 K/UL (ref 0.5–4.6)
LYMPHOCYTES NFR BLD: 16 % (ref 13–44)
MCH RBC QN AUTO: 31.3 PG (ref 26.1–32.9)
MCHC RBC AUTO-ENTMCNC: 32.8 G/DL (ref 31.4–35)
MCV RBC AUTO: 95.3 FL (ref 82–102)
MONOCYTES # BLD: 0.9 K/UL (ref 0.1–1.3)
MONOCYTES NFR BLD: 9 % (ref 4–12)
NEUTS SEG # BLD: 6.7 K/UL (ref 1.7–8.2)
NEUTS SEG NFR BLD: 74 % (ref 43–78)
NRBC # BLD: 0 K/UL (ref 0–0.2)
PLATELET # BLD AUTO: 150 K/UL (ref 150–450)
PMV BLD AUTO: 11.4 FL (ref 9.4–12.3)
POTASSIUM SERPL-SCNC: 4.3 MMOL/L (ref 3.5–5.1)
PROT SERPL-MCNC: 6.9 G/DL (ref 6.3–8.2)
PSA SERPL-MCNC: <0.1 NG/ML
RBC # BLD AUTO: 4.7 M/UL (ref 4.23–5.6)
SODIUM SERPL-SCNC: 139 MMOL/L (ref 136–146)
TRIGL SERPL-MCNC: 75 MG/DL (ref 35–150)
VLDLC SERPL CALC-MCNC: 15 MG/DL (ref 6–23)
WBC # BLD AUTO: 9.1 K/UL (ref 4.3–11.1)

## 2024-02-06 PROCEDURE — 99213 OFFICE O/P EST LOW 20 MIN: CPT | Performed by: INTERNAL MEDICINE

## 2024-02-06 PROCEDURE — G8417 CALC BMI ABV UP PARAM F/U: HCPCS | Performed by: INTERNAL MEDICINE

## 2024-02-06 PROCEDURE — G8484 FLU IMMUNIZE NO ADMIN: HCPCS | Performed by: INTERNAL MEDICINE

## 2024-02-06 PROCEDURE — 1123F ACP DISCUSS/DSCN MKR DOCD: CPT | Performed by: INTERNAL MEDICINE

## 2024-02-06 PROCEDURE — G0439 PPPS, SUBSEQ VISIT: HCPCS | Performed by: INTERNAL MEDICINE

## 2024-02-06 PROCEDURE — G8427 DOCREV CUR MEDS BY ELIG CLIN: HCPCS | Performed by: INTERNAL MEDICINE

## 2024-02-06 PROCEDURE — 1036F TOBACCO NON-USER: CPT | Performed by: INTERNAL MEDICINE

## 2024-02-06 RX ORDER — TRAMADOL HYDROCHLORIDE 50 MG/1
50 TABLET ORAL DAILY PRN
Qty: 30 TABLET | Refills: 3 | Status: SHIPPED | OUTPATIENT
Start: 2024-02-06 | End: 2024-02-13

## 2024-02-06 SDOH — ECONOMIC STABILITY: INCOME INSECURITY: HOW HARD IS IT FOR YOU TO PAY FOR THE VERY BASICS LIKE FOOD, HOUSING, MEDICAL CARE, AND HEATING?: NOT HARD AT ALL

## 2024-02-06 SDOH — ECONOMIC STABILITY: FOOD INSECURITY: WITHIN THE PAST 12 MONTHS, YOU WORRIED THAT YOUR FOOD WOULD RUN OUT BEFORE YOU GOT MONEY TO BUY MORE.: NEVER TRUE

## 2024-02-06 SDOH — ECONOMIC STABILITY: FOOD INSECURITY: WITHIN THE PAST 12 MONTHS, THE FOOD YOU BOUGHT JUST DIDN'T LAST AND YOU DIDN'T HAVE MONEY TO GET MORE.: NEVER TRUE

## 2024-02-06 ASSESSMENT — LIFESTYLE VARIABLES
HOW OFTEN DO YOU HAVE A DRINK CONTAINING ALCOHOL: NEVER
HOW MANY STANDARD DRINKS CONTAINING ALCOHOL DO YOU HAVE ON A TYPICAL DAY: PATIENT DOES NOT DRINK

## 2024-02-06 ASSESSMENT — PATIENT HEALTH QUESTIONNAIRE - PHQ9
SUM OF ALL RESPONSES TO PHQ9 QUESTIONS 1 & 2: 1
SUM OF ALL RESPONSES TO PHQ QUESTIONS 1-9: 1
1. LITTLE INTEREST OR PLEASURE IN DOING THINGS: 1
2. FEELING DOWN, DEPRESSED OR HOPELESS: 0
SUM OF ALL RESPONSES TO PHQ QUESTIONS 1-9: 1

## 2024-02-06 NOTE — PROGRESS NOTES
tablet by mouth every 6 hours as needed for Pain Yes Provider, MD Emeli   omeprazole (PRILOSEC) 20 MG delayed release capsule Take 1 capsule by mouth Daily Yes Automatic Reconciliation, Ar       CareTeam (Including outside providers/suppliers regularly involved in providing care):   Patient Care Team:  Leo Orr MD as PCP - General  Leo Orr MD as PCP - Empaneled Provider     Reviewed and updated this visit:  Tobacco  Allergies  Med Hx  Surg Hx  Soc Hx  Fam Hx

## 2024-02-15 ENCOUNTER — TELEPHONE (OUTPATIENT)
Dept: SLEEP MEDICINE | Age: 89
End: 2024-02-15

## 2024-02-15 NOTE — TELEPHONE ENCOUNTER
----- Message from MIRELLA Hernandez CNP sent at 2/15/2024  8:25 AM EST -----  Please let patient know that oxygen is good on current setting. No change is needed at this time.    Thanks,   GLORIA Hernandez

## 2024-02-26 ENCOUNTER — TELEPHONE (OUTPATIENT)
Age: 89
End: 2024-02-26

## 2024-02-26 NOTE — TELEPHONE ENCOUNTER
Pt.sees  next week on Tuesday.About a week ago had squatted down ,got dizzy and fell backwards and hit head.He was not evaluated after in the ER.He is having some dizziness in head since then and higher HR as high as 111.He still has ongoing dizzy but that has been ongoing for months.BP running 150-164/70's.HR in 110 range.He is not hydrating much.Wants  to advise..      I advised Urgent Care/ER but he says head dizziness ongoing for months.

## 2024-02-27 RX ORDER — METOPROLOL TARTRATE 100 MG/1
100 TABLET ORAL 2 TIMES DAILY
Qty: 60 TABLET | Refills: 11 | Status: SHIPPED | OUTPATIENT
Start: 2024-02-27

## 2024-02-27 NOTE — TELEPHONE ENCOUNTER
I called and informed pt.of 's response and escribed med as below  Requested Prescriptions     Signed Prescriptions Disp Refills    metoprolol tartrate (LOPRESSOR) 100 MG tablet 60 tablet 11     Sig: Take 1 tablet by mouth 2 times daily     Authorizing Provider: NOEL JORGENSEN     Ordering User: SHYLA KO     Pt. V/u.

## 2024-02-27 NOTE — TELEPHONE ENCOUNTER
Agree, try doubling metoprolol to 100 BID as well for now.  See PCP soon, see me as planned.  Thanks

## 2024-02-27 NOTE — TELEPHONE ENCOUNTER
CT scan of head was good in ER.He is still dizzy today./74 hr=96.Appt.made w/ 3/5.Pt.advised to call PCP for sooner appt.Note sent to  to see if any advice/orders prior to upcoming appt.

## 2024-03-05 ENCOUNTER — OFFICE VISIT (OUTPATIENT)
Age: 89
End: 2024-03-05
Payer: MEDICARE

## 2024-03-05 VITALS
HEIGHT: 76 IN | WEIGHT: 184 LBS | DIASTOLIC BLOOD PRESSURE: 72 MMHG | SYSTOLIC BLOOD PRESSURE: 124 MMHG | HEART RATE: 76 BPM | BODY MASS INDEX: 22.41 KG/M2

## 2024-03-05 DIAGNOSIS — I10 BENIGN ESSENTIAL HYPERTENSION: ICD-10-CM

## 2024-03-05 DIAGNOSIS — I35.1 NONRHEUMATIC AORTIC VALVE INSUFFICIENCY: ICD-10-CM

## 2024-03-05 DIAGNOSIS — I65.23 BILATERAL CAROTID ARTERY STENOSIS: Primary | ICD-10-CM

## 2024-03-05 DIAGNOSIS — I48.0 PAROXYSMAL ATRIAL FIBRILLATION (HCC): ICD-10-CM

## 2024-03-05 DIAGNOSIS — I25.10 CORONARY ARTERY DISEASE INVOLVING NATIVE CORONARY ARTERY OF NATIVE HEART WITHOUT ANGINA PECTORIS: ICD-10-CM

## 2024-03-05 PROCEDURE — G8484 FLU IMMUNIZE NO ADMIN: HCPCS | Performed by: INTERNAL MEDICINE

## 2024-03-05 PROCEDURE — 1123F ACP DISCUSS/DSCN MKR DOCD: CPT | Performed by: INTERNAL MEDICINE

## 2024-03-05 PROCEDURE — G8427 DOCREV CUR MEDS BY ELIG CLIN: HCPCS | Performed by: INTERNAL MEDICINE

## 2024-03-05 PROCEDURE — 99214 OFFICE O/P EST MOD 30 MIN: CPT | Performed by: INTERNAL MEDICINE

## 2024-03-05 PROCEDURE — 1036F TOBACCO NON-USER: CPT | Performed by: INTERNAL MEDICINE

## 2024-03-05 PROCEDURE — G8420 CALC BMI NORM PARAMETERS: HCPCS | Performed by: INTERNAL MEDICINE

## 2024-03-05 RX ORDER — MECLIZINE HYDROCHLORIDE 25 MG/1
25 TABLET ORAL 3 TIMES DAILY PRN
Qty: 30 TABLET | Refills: 0 | Status: SHIPPED | OUTPATIENT
Start: 2024-03-05 | End: 2024-03-15

## 2024-03-05 RX ORDER — METOPROLOL TARTRATE 100 MG/1
100 TABLET ORAL 2 TIMES DAILY
Qty: 180 TABLET | Refills: 3 | Status: SHIPPED | OUTPATIENT
Start: 2024-03-05

## 2024-03-12 ENCOUNTER — TELEPHONE (OUTPATIENT)
Dept: INTERNAL MEDICINE CLINIC | Facility: CLINIC | Age: 89
End: 2024-03-12

## 2024-03-12 NOTE — TELEPHONE ENCOUNTER
Mr. Matt has called and is having some issues with vertigo. His cardiologist wants him to follow up with Dr. Orr. There are no appointments available. He is asking if you can call in something for him?

## 2024-03-13 ENCOUNTER — TELEPHONE (OUTPATIENT)
Dept: INTERNAL MEDICINE CLINIC | Facility: CLINIC | Age: 89
End: 2024-03-13

## 2024-03-13 RX ORDER — MECLIZINE HCL 12.5 MG/1
12.5 TABLET ORAL 3 TIMES DAILY PRN
Qty: 30 TABLET | Refills: 3 | Status: SHIPPED | OUTPATIENT
Start: 2024-03-13 | End: 2024-03-13 | Stop reason: DRUGHIGH

## 2024-03-13 RX ORDER — MECLIZINE HYDROCHLORIDE 25 MG/1
25 TABLET ORAL 3 TIMES DAILY PRN
Qty: 30 TABLET | Refills: 1 | Status: SHIPPED | OUTPATIENT
Start: 2024-03-13 | End: 2024-04-02

## 2024-03-13 NOTE — TELEPHONE ENCOUNTER
Okay for 25 mg.  Older patients tend to not tolerate the higher doses well so I would hesitate to go to 50 mg cms

## 2024-03-13 NOTE — TELEPHONE ENCOUNTER
Pt's wife is concerned about the lower dosage of the Meclizine. She wants to know why if he was taking 25mg before he has now been prescribed 12.5 mg. Also, she wants to know if Meclizine can be prescribed as 50mg. Please call her at [phone]

## 2024-03-13 NOTE — TELEPHONE ENCOUNTER
This has been fully explained to the patient's wife, who indicates understanding.  Per pt wife she talk to her  and will call the office back to schedule an appt to see what the next steps are. Pt has tried physical therapy with no relief.      Rx pending below to be sent to pt's pharmacy.

## 2024-03-13 NOTE — TELEPHONE ENCOUNTER
Pt notified. He was concerned about the dosage being lower than what he was taken before (meclizine 25mg tid prn). I instructed him try the new dosage and to call us if it does not work for him.

## 2024-03-21 ENCOUNTER — OFFICE VISIT (OUTPATIENT)
Dept: ORTHOPEDIC SURGERY | Age: 89
End: 2024-03-21

## 2024-03-21 DIAGNOSIS — M25.552 BILATERAL HIP PAIN: Primary | ICD-10-CM

## 2024-03-21 DIAGNOSIS — M25.551 BILATERAL HIP PAIN: Primary | ICD-10-CM

## 2024-03-21 NOTE — PROGRESS NOTES
Normocephalic, atraumatic  Neck: Supple without obvious mass  CV/Pulm: Breathing even and unlabored  Abdomen: Nondistended on gross examination  Circulation: Normal without obvious arterial or venous deficiency. Pulses palpable bilateral lower extremities.  Lymphatic: No obvious lymphedema or lymphadenopathy noted.  Skin: No obvious lacerations, lesions or rash noted.  Musculoskeletal: No obvious deformity or pain with active movement of the upper extremities.  Neuro: No obvious deficiency or weakness noted of the upper or lower extremities    Hip Exam:   Exam of the hip reveals anterior groin pain with resisted leg raise and FADIR testing. Internal and external rotation is decreased in the hip. No evidence of arterial of venous insufficiency. DP/PT pulses appreciable. Able to plantar- and dorsi-flex the foot/ankle.                  Imaging:    Images obtained today or prior    XR HIP RT W OR WO PELVIS 2-3 VWS  Views Obtained: AP pelvis, lateral right hip  Indication: Right Hip Pain  Findings:  Xrays including A/P Pelvis and lateral of the hip(s) were reviewed which demonstrate severe joint space narrowing of the right hip. There is osteophyte formation around the acetabulum and femoral head. Subchondral sclerosis noted. No obvious fracture appreciated. There is no acute bony abnormality such as malignancy appreciated.   Impression: Osteoarthritis of the right hip    Froy Viramontes MD    Assessment:   Hip Arthritis    Plan:  Differential diagnosis and treatment options have been discussed with the patient. Risks, benefits and alternatives of each were discussed and patient questions answered.  At this point the patient would like to proceed with total hip replacement    Treatment:    Total Hip Replacement- The patient has failed previous conservative treatment for this condition. The patient has pain in the hip which causes daily symptoms which affects the patient's activities of daily living and quality of life.

## 2024-03-25 ENCOUNTER — TELEPHONE (OUTPATIENT)
Age: 89
End: 2024-03-25

## 2024-03-25 DIAGNOSIS — M16.11 PRIMARY OSTEOARTHRITIS OF RIGHT HIP: Primary | ICD-10-CM

## 2024-03-25 NOTE — TELEPHONE ENCOUNTER
Patient having Right ARMIDA on 05/03/24 with Dr. Froy Viramontes under Spinal. Requesting risk assessment and any medication hold. Fax: [phone] ATTN: Kathy

## 2024-03-27 ENCOUNTER — TELEPHONE (OUTPATIENT)
Dept: INTERNAL MEDICINE CLINIC | Facility: CLINIC | Age: 89
End: 2024-03-27

## 2024-03-27 NOTE — TELEPHONE ENCOUNTER
----- Message from Chelsy Abdullahi sent at 3/27/2024  3:43 PM EDT -----  Subject: Message to Provider    QUESTIONS  Information for Provider? The patient's wife Ann-Marie called to reschedule a   the appointment on 3/28 with Dr Orr. No availability to schedule with   Dr Orr. please contact the patient wife Ann-Marie to schedule [phone]  ---------------------------------------------------------------------------  --------------  CALL BACK INFO  [phone]; OK to leave message on voicemail  ---------------------------------------------------------------------------  --------------  SCRIPT ANSWERS  Relationship to Patient? Self

## 2024-03-29 ENCOUNTER — OFFICE VISIT (OUTPATIENT)
Dept: INTERNAL MEDICINE CLINIC | Facility: CLINIC | Age: 89
End: 2024-03-29
Payer: MEDICARE

## 2024-03-29 VITALS
TEMPERATURE: 97.1 F | SYSTOLIC BLOOD PRESSURE: 149 MMHG | WEIGHT: 185.4 LBS | HEART RATE: 86 BPM | BODY MASS INDEX: 22.58 KG/M2 | RESPIRATION RATE: 16 BRPM | DIASTOLIC BLOOD PRESSURE: 73 MMHG | HEIGHT: 76 IN

## 2024-03-29 DIAGNOSIS — M54.16 LUMBAR RADICULOPATHY: ICD-10-CM

## 2024-03-29 DIAGNOSIS — N39.0 URINARY TRACT INFECTION WITHOUT HEMATURIA, SITE UNSPECIFIED: Primary | ICD-10-CM

## 2024-03-29 DIAGNOSIS — M54.41 ACUTE BILATERAL LOW BACK PAIN WITH RIGHT-SIDED SCIATICA: ICD-10-CM

## 2024-03-29 DIAGNOSIS — C61 MALIGNANT NEOPLASM OF PROSTATE (HCC): ICD-10-CM

## 2024-03-29 DIAGNOSIS — I48.0 PAROXYSMAL ATRIAL FIBRILLATION (HCC): ICD-10-CM

## 2024-03-29 LAB
APPEARANCE UR: CLEAR
BILIRUB UR QL: NEGATIVE
COLOR UR: ABNORMAL
GLUCOSE UR STRIP.AUTO-MCNC: NEGATIVE MG/DL
HGB UR QL STRIP: NEGATIVE
KETONES UR QL STRIP.AUTO: ABNORMAL MG/DL
LEUKOCYTE ESTERASE UR QL STRIP.AUTO: NEGATIVE
NITRITE UR QL STRIP.AUTO: NEGATIVE
PH UR STRIP: 6 (ref 5–9)
PROT UR STRIP-MCNC: 30 MG/DL
SP GR UR REFRACTOMETRY: 1.02 (ref 1–1.02)
UROBILINOGEN UR QL STRIP.AUTO: 0.2 EU/DL (ref 0.2–1)

## 2024-03-29 PROCEDURE — 1123F ACP DISCUSS/DSCN MKR DOCD: CPT | Performed by: INTERNAL MEDICINE

## 2024-03-29 PROCEDURE — 1036F TOBACCO NON-USER: CPT | Performed by: INTERNAL MEDICINE

## 2024-03-29 PROCEDURE — G8420 CALC BMI NORM PARAMETERS: HCPCS | Performed by: INTERNAL MEDICINE

## 2024-03-29 PROCEDURE — G8484 FLU IMMUNIZE NO ADMIN: HCPCS | Performed by: INTERNAL MEDICINE

## 2024-03-29 PROCEDURE — G8427 DOCREV CUR MEDS BY ELIG CLIN: HCPCS | Performed by: INTERNAL MEDICINE

## 2024-03-29 PROCEDURE — 99214 OFFICE O/P EST MOD 30 MIN: CPT | Performed by: INTERNAL MEDICINE

## 2024-03-29 RX ORDER — NITROFURANTOIN 25; 75 MG/1; MG/1
100 CAPSULE ORAL 2 TIMES DAILY
COMMUNITY
Start: 2024-03-27

## 2024-03-29 RX ORDER — TAMSULOSIN HYDROCHLORIDE 0.4 MG/1
0.4 CAPSULE ORAL DAILY
COMMUNITY
Start: 2024-03-27

## 2024-03-29 NOTE — PROGRESS NOTES
3/29/2024 1:04 PM  Location:[facility] PHYSICIAN SERVICES  [facility] INTERNAL MEDICINE  [place]  Patient #:  [id number]  YOB: 1935          YOUR LAST HEMOGLOBIN A1CS:   No results found for: \"HBA1C\", \"[id number]\"    YOUR LAST LIPID PROFILE:   Lab Results   Component Value Date/Time    CHOL 139 02/06/2024 10:19 AM    HDL 41 02/06/2024 10:19 AM    VLDL 24 01/31/2022 09:57 AM         Lab Results   Component Value Date/Time    GFRAA 47 01/31/2022 09:57 AM    BUN 24 02/06/2024 10:19 AM     02/06/2024 10:19 AM    K 4.3 02/06/2024 10:19 AM     02/06/2024 10:19 AM    CO2 28 02/06/2024 10:19 AM           History of Present Illness     Chief Complaint   Patient presents with    ER Follow-up     [facility] ER Follow-up from 3/17/2024    Cystitis     Still not doing any better; after he finished the antibiotics his symptoms came back. He went to an urgent care on Tues and was put on nitrofurantoin and tamsulosin.     This patient was seen a local emergency room on March 17 for left lower back pain radiating to the left abdominal area associated with urinary frequency.  Urinalysis was unremarkable however urine culture grew Klebsiella 25,000 as noted below.  Patient was given Cipro 500 mg twice daily for 7 days.  Yesterday patient had similar symptoms and went back to urgent care do not have those results here.  He was placed on Macrodantin.  Of note is that his previous culture was resistant to Macrodantin.           Contains abnormal data Culture Urine Bacterial  Specimen: Urine - Urine specimen from urethra (specimen)  Component 12 d ago   Culture, Urine 25,000 CFU/mL Klebsiella pneumoniae Abnormal    Resulting Agency  [facility]   Susceptibility    Organism Antibiotic Method Susceptibility   Klebsiella pneumoniae Amoxicillin/Clavulanate EPHRAIM <=8/4 ug/ml: Susceptible   Klebsiella pneumoniae Ampicillin EPHRAIM >16 ug/ml: Resistant   Klebsiella pneumoniae

## 2024-04-15 ENCOUNTER — PREP FOR PROCEDURE (OUTPATIENT)
Dept: ORTHOPEDIC SURGERY | Age: 89
End: 2024-04-15

## 2024-04-15 DIAGNOSIS — Z01.818 PRE-OP EVALUATION: Primary | ICD-10-CM

## 2024-04-15 RX ORDER — SODIUM CHLORIDE 9 MG/ML
INJECTION, SOLUTION INTRAVENOUS PRN
Status: CANCELLED | OUTPATIENT
Start: 2024-04-15

## 2024-04-15 RX ORDER — SODIUM CHLORIDE 0.9 % (FLUSH) 0.9 %
5-40 SYRINGE (ML) INJECTION EVERY 12 HOURS SCHEDULED
Status: CANCELLED | OUTPATIENT
Start: 2024-04-15

## 2024-04-15 RX ORDER — SODIUM CHLORIDE 0.9 % (FLUSH) 0.9 %
5-40 SYRINGE (ML) INJECTION PRN
Status: CANCELLED | OUTPATIENT
Start: 2024-04-15

## 2024-04-23 ENCOUNTER — HOSPITAL ENCOUNTER (OUTPATIENT)
Dept: SURGERY | Age: 89
Discharge: HOME | End: 2024-04-26
Payer: MEDICARE

## 2024-04-23 ENCOUNTER — HOSPITAL ENCOUNTER (OUTPATIENT)
Dept: REHABILITATION | Age: 89
Discharge: HOME | End: 2024-04-26
Payer: MEDICARE

## 2024-04-23 VITALS
WEIGHT: 183.5 LBS | TEMPERATURE: 98 F | DIASTOLIC BLOOD PRESSURE: 70 MMHG | OXYGEN SATURATION: 98 % | HEART RATE: 70 BPM | SYSTOLIC BLOOD PRESSURE: 150 MMHG | HEIGHT: 70 IN | BODY MASS INDEX: 26.27 KG/M2

## 2024-04-23 DIAGNOSIS — Z01.818 PRE-OP EVALUATION: ICD-10-CM

## 2024-04-23 LAB
ALBUMIN SERPL-MCNC: 4 G/DL (ref 3.2–4.6)
ANION GAP SERPL CALC-SCNC: 15 MMOL/L (ref 9–18)
BASOPHILS # BLD: 0 K/UL (ref 0–0.2)
BASOPHILS NFR BLD: 0 % (ref 0–2)
BUN SERPL-MCNC: 20 MG/DL (ref 8–23)
CALCIUM SERPL-MCNC: 9.8 MG/DL (ref 8.8–10.2)
CHLORIDE SERPL-SCNC: 102 MMOL/L (ref 98–107)
CO2 SERPL-SCNC: 21 MMOL/L (ref 20–28)
CREAT SERPL-MCNC: 1.24 MG/DL (ref 0.8–1.3)
DIFFERENTIAL METHOD BLD: ABNORMAL
EOSINOPHIL # BLD: 0.1 K/UL (ref 0–0.8)
EOSINOPHIL NFR BLD: 1 % (ref 0.5–7.8)
ERYTHROCYTE [DISTWIDTH] IN BLOOD BY AUTOMATED COUNT: 14.3 % (ref 11.9–14.6)
EST. AVERAGE GLUCOSE BLD GHB EST-MCNC: 106 MG/DL
GLUCOSE SERPL-MCNC: 100 MG/DL (ref 70–99)
HBA1C MFR BLD: 5.3 % (ref 0–5.6)
HCT VFR BLD AUTO: 38.8 % (ref 41.1–50.3)
HGB BLD-MCNC: 13.1 G/DL (ref 13.6–17.2)
IMM GRANULOCYTES # BLD AUTO: 0.1 K/UL (ref 0–0.5)
IMM GRANULOCYTES NFR BLD AUTO: 1 % (ref 0–5)
INR PPP: 1
LYMPHOCYTES # BLD: 1.6 K/UL (ref 0.5–4.6)
LYMPHOCYTES NFR BLD: 19 % (ref 13–44)
MCH RBC QN AUTO: 31.3 PG (ref 26.1–32.9)
MCHC RBC AUTO-ENTMCNC: 33.8 G/DL (ref 31.4–35)
MCV RBC AUTO: 92.8 FL (ref 82–102)
MONOCYTES # BLD: 0.8 K/UL (ref 0.1–1.3)
MONOCYTES NFR BLD: 9 % (ref 4–12)
NEUTS SEG # BLD: 5.7 K/UL (ref 1.7–8.2)
NEUTS SEG NFR BLD: 70 % (ref 43–78)
NRBC # BLD: 0 K/UL (ref 0–0.2)
PLATELET # BLD AUTO: 180 K/UL (ref 150–450)
PMV BLD AUTO: 10.5 FL (ref 9.4–12.3)
POTASSIUM SERPL-SCNC: 4.4 MMOL/L (ref 3.5–5.1)
PROTHROMBIN TIME: 13.1 SEC (ref 11.3–14.9)
RBC # BLD AUTO: 4.18 M/UL (ref 4.23–5.6)
SODIUM SERPL-SCNC: 138 MMOL/L (ref 136–145)
WBC # BLD AUTO: 8.2 K/UL (ref 4.3–11.1)

## 2024-04-23 PROCEDURE — 80048 BASIC METABOLIC PNL TOTAL CA: CPT

## 2024-04-23 PROCEDURE — 83036 HEMOGLOBIN GLYCOSYLATED A1C: CPT

## 2024-04-23 PROCEDURE — 85610 PROTHROMBIN TIME: CPT

## 2024-04-23 PROCEDURE — 85025 COMPLETE CBC W/AUTO DIFF WBC: CPT

## 2024-04-23 PROCEDURE — 87641 MR-STAPH DNA AMP PROBE: CPT

## 2024-04-23 PROCEDURE — 94760 N-INVAS EAR/PLS OXIMETRY 1: CPT

## 2024-04-23 PROCEDURE — 97161 PT EVAL LOW COMPLEX 20 MIN: CPT

## 2024-04-23 PROCEDURE — 82040 ASSAY OF SERUM ALBUMIN: CPT

## 2024-04-23 PROCEDURE — 80307 DRUG TEST PRSMV CHEM ANLYZR: CPT

## 2024-04-23 ASSESSMENT — HOOS JR
LYING IN BED (TURNING OVER, MAINTAINING HIP POSITION): MODERATE
HOOS JR RAW SCORE: 15
SITTING: MILD
GOING UP OR DOWN STAIRS: SEVERE
HOOS JR RAW SCORE: 15
HOOS JR TOTAL INTERVAL SCORE: 43.335
BENDING TO THE FLOOR TO PICK UP OBJECT: SEVERE
RISING FROM SITTING: SEVERE
WALKING ON UNEVEN SURFACE: SEVERE

## 2024-04-23 ASSESSMENT — PROMIS GLOBAL HEALTH SCALE
SUM OF RESPONSES TO QUESTIONS 2, 4, 5, & 10: 11
TO WHAT EXTENT ARE YOU ABLE TO CARRY OUT YOUR EVERYDAY PHYSICAL ACTIVITIES SUCH AS WALKING, CLIMBING STAIRS, CARRYING GROCERIES, OR MOVING A CHAIR [ON A SCALE OF 1 (NOT AT ALL) TO 5 (COMPLETELY)]?: A LITTLE
IN GENERAL, WOULD YOU SAY YOUR QUALITY OF LIFE IS...[ON A SCALE OF 1 (POOR) TO 5 (EXCELLENT)]: GOOD
IN THE PAST 7 DAYS, HOW WOULD YOU RATE YOUR PAIN ON AVERAGE [ON A SCALE FROM 0 (NO PAIN) TO 10 (WORST IMAGINABLE PAIN)]?: 7
IN THE PAST 7 DAYS, HOW WOULD YOU RATE YOUR FATIGUE ON AVERAGE [ON A SCALE FROM 1 (NONE) TO 5 (VERY SEVERE)]?: MODERATE
IN GENERAL, HOW WOULD YOU RATE YOUR PHYSICAL HEALTH [ON A SCALE OF 1 (POOR) TO 5 (EXCELLENT)]?: GOOD
IN GENERAL, WOULD YOU SAY YOUR HEALTH IS...[ON A SCALE OF 1 (POOR) TO 5 (EXCELLENT)]: GOOD
SUM OF RESPONSES TO QUESTIONS 3, 6, 7, & 8: 15
IN GENERAL, HOW WOULD YOU RATE YOUR MENTAL HEALTH, INCLUDING YOUR MOOD AND YOUR ABILITY TO THINK [ON A SCALE OF 1 (POOR) TO 5 (EXCELLENT)]?: FAIR
IN GENERAL, PLEASE RATE HOW WELL YOU CARRY OUT YOUR USUAL SOCIAL ACTIVITIES (INCLUDES ACTIVITIES AT HOME, AT WORK, AND IN YOUR COMMUNITY, AND RESPONSIBILITIES AS A PARENT, CHILD, SPOUSE, EMPLOYEE, FRIEND, ETC) [ON A SCALE OF 1 (POOR) TO 5 (EXCELLENT)]?: GOOD
HOW IS THE PROMIS V1.1 BEING ADMINISTERED?: PAPER
IN GENERAL, HOW WOULD YOU RATE YOUR SATISFACTION WITH YOUR SOCIAL ACTIVITIES AND RELATIONSHIPS [ON A SCALE OF 1 (POOR) TO 5 (EXCELLENT)]?: GOOD
WHO IS THE PERSON COMPLETING THE PROMIS V1.1 SURVEY?: SELF
IN THE PAST 7 DAYS, HOW OFTEN HAVE YOU BEEN BOTHERED BY EMOTIONAL PROBLEMS, SUCH AS FEELING ANXIOUS, DEPRESSED, OR IRRITABLE [ON A SCALE FROM 1 (NEVER) TO 5 (ALWAYS)]?: SOMETIMES

## 2024-04-23 ASSESSMENT — PAIN SCALES - GENERAL: PAINLEVEL_OUTOF10: 9

## 2024-04-23 ASSESSMENT — PAIN DESCRIPTION - DESCRIPTORS: DESCRIPTORS: ACHING;PRESSURE;SHARP;SHOOTING

## 2024-04-23 ASSESSMENT — PAIN DESCRIPTION - ORIENTATION: ORIENTATION: RIGHT;POSTERIOR;OUTER

## 2024-04-23 ASSESSMENT — PAIN DESCRIPTION - LOCATION: LOCATION: HIP

## 2024-04-23 NOTE — PROGRESS NOTES
Retired    Dominant Side:  Dominant Hand: : Right      Current Functional Status:   Ambulation:  [x] Independent  [x] Walk Indoors Only  [] Walk Outdoors  [x] Use Assistive Device  [] Use Wheelchair Only Dressing:  [x] Independent  Requires Assistance from Someone for:  [] Sock/Shoes  [] Pants  [] Everything   Bathing/Showering:   [x] Independent  [] Requires Assistance from Someone  [] Sponge Bath Only Household Activities:  [] Routine house and yard work  [] Light Housework Only  [x] None     Objective:   PAIN:   Pre-Treatment Pain  Pain Assessment: 0-10  Pain Level: 9 (at its worst)  Pain Location: Hip  Pain Orientation: Right, Posterior, Outer  Pain Descriptors: Aching, Pressure, Sharp, Shooting    Post Treatment: 3    Outcome Measure:   HOOS-JR:  Total Raw Score (0-24 Scale): 15  JR Ruby. Hip Survey 1. Going up or down stairs 2. Walking on an uneven surface 3. Rising from sitting  4. Bending to the floor/ an object 5. Lying in bed (turning over, maintaining hip position) 6. Sitting  HOOS Jr. Raw Score   4/23/2024   2:06 PM 3 3 3 3 2 1 15        KOOS-JR:          No data to display                 LOWER EXTREMITY GROSS EVALUATION:  RIGHT LE   Within Functional Limits   Abnormal   Comments   Strength [] [] Strength RLE  Strength RLE: Exception  R Hip Flexion: 2+/5  R Hip ABduction: 2+/5  R Hip ADduction: 2+/5  R Knee Extension: 2+/5  R Ankle Dorsiflexion: 3/5  R Ankle Plantar flexion: 3/5   Range of Motion [] [] AROM RLE (degrees)  RLE AROM: Exceptions  R Hip Flexion (0-125): 80  R Hip ABduction (0-45): 15      LEFT LE Within Functional Limits   Abnormal   Comments   Strength [] []  Grossly 3+/5   Range of Motion [x] []       UPPER EXTREMITY GROSS EVALUATION:     Within Functional Limits   Abnormal   Comments   Strength [x] []    Range of Motion [x] []      SENSATION  Sensation [x]       COGNITION/  PERCEPTION: Intact Impaired (Comments):   Orientation [x]     Vision [x]     Hearing [x]     Cognition  [x]

## 2024-04-23 NOTE — PERIOP NOTE
Lab results within anesthesia guidelines, no follow-up required. Labs automatically routed to ordering provider via Epic documentation.      MRSA swab and A1c still processing.       Latest Reference Range & Units 04/23/24 12:49   Sodium 136 - 145 mmol/L 138   Potassium 3.5 - 5.1 mmol/L 4.4   Chloride 98 - 107 mmol/L 102   CARBON DIOXIDE 20 - 28 mmol/L 21   BUN,BUNPL 8 - 23 MG/DL 20   Creatinine 0.80 - 1.30 MG/DL 1.24   Anion Gap 9 - 18 mmol/L 15   Est, Glom Filt Rate >60 ml/min/1.73m2 57 (L)   Glucose, Random 70 - 99 mg/dL 100 (H)   Calcium, Total 8.8 - 10.2 MG/DL 9.8   Albumin 3.2 - 4.6 g/dL 4.0   WBC 4.3 - 11.1 K/uL 8.2   RBC 4.23 - 5.6 M/uL 4.18 (L)   Hemoglobin Quant 13.6 - 17.2 g/dL 13.1 (L)   Hematocrit 41.1 - 50.3 % 38.8 (L)   MCV 82.0 - 102.0 FL 92.8   MCH 26.1 - 32.9 PG 31.3   MCHC 31.4 - 35.0 g/dL 33.8   MPV 9.4 - 12.3 FL 10.5   RDW 11.9 - 14.6 % 14.3   Platelet Count 150 - 450 K/uL 180   Neutrophils/100 leukocytes 43 - 78 % 70   Lymphocyte % 13 - 44 % 19   Monocytes % 4.0 - 12.0 % 9   Eosinophils % 0.5 - 7.8 % 1   Basophils % 0.0 - 2.0 % 0   Neutrophils Absolute 1.7 - 8.2 K/UL 5.7   Lymphocytes Absolute 0.5 - 4.6 K/UL 1.6   Monocytes Absolute 0.1 - 1.3 K/UL 0.8   Eosinophils Absolute 0.0 - 0.8 K/UL 0.1   Basophils Absolute 0.0 - 0.2 K/UL 0.0   Differential Type -   AUTOMATED   Immature Granulocytes % 0.0 - 5.0 % 1   Nucleated Red Blood Cells 0.0 - 0.2 K/uL 0.00   Immature Granulocytes Absolute 0.0 - 0.5 K/UL 0.1   Prothrombin Time 11.3 - 14.9 sec 13.1   INR -   1.0   MRSA/STAPH AUREUS DNA  Rpt   (L): Data is abnormally low  (H): Data is abnormally high  Rpt: View report in Results Review for more information

## 2024-04-23 NOTE — PERIOP NOTE
Patient verified name and .    Order for consent IS found in EHR and matches case posting; patient verified.     Type 3 surgery, joint camp assessment complete.    Labs per surgeon: CBC,BMP, PT/PTT, Hgb A1c, albumin, nicotine, MRSA swab ; results processing  Labs per anesthesia protocol: no additional  EKG: dated 23 in EHR is within anesthesia protocols.      Cardiology note dated 3/25/24 in EHR states:    \"Remain on ASA, endocarditis proph needed, avg risk.   Thanks\"          MRSA/MSSA swab collected; pharmacy to review and dose antibiotic as appropriate.     Hospital approved surgical skin cleanser and instructions to return bottle on DOS given per hospital policy.    Patient provided with handouts including Guide to Surgery, Pain Management, Hand Hygiene, Blood Transfusion Education, and [place] [facility] Brochure.    Patient answered medical/surgical history questions at their best of ability. All prior to admission medications documented in [facility]. Original medication prescription bottles were visualized during patient appointment.     Patient instructed to hold all vitamins 3 weeks prior to surgery and NSAIDS 5 days prior to surgery.     Patient teach back successful and patient demonstrates knowledge of instruction.

## 2024-04-23 NOTE — PERIOP NOTE
PLEASE CONTINUE TAKING ALL PRESCRIPTION MEDICATIONS UP TO THE DAY OF SURGERY UNLESS OTHERWISE DIRECTED BELOW.    DISCONTINUE all vitamins, herbals and supplements 3 weeks prior to surgery. DISCONTINUE Non-Steroidal Anti-Inflammatory (NSAIDS) such as Advil, Ibuprofen, Motrin, Naproxen and Aleve 5 days prior to surgery.       Home Medications to take  the day of surgery    Metoprolol, Tramadol if needed, Aspirin 81 mg,    Omeprazole        Home Medications to Hold- please continue all other medications except these.    Hold Aleve for 5 days before surgery.        Comments   On the day before surgery please take Acetaminophen 1000mg in the morning and then again before bed. You may substitute for Tylenol 650 mg.      Bring Dynahex wash, Incentive Spirometer, cpap machine, omeprazole with you to hospital on the day of surgery.            Please do not bring home medications with you on the day of surgery unless otherwise directed by your nurse.  If you are instructed to bring home medications, please give them to your nurse as they will be administered by the nursing staff.    If you have any questions, please call [facility] [phone].    A copy of this note was provided to the patient for reference.

## 2024-04-24 LAB
COMMENT:: NORMAL
COTININE UR QL SCN: NEGATIVE NG/ML
MRSA DNA SPEC QL NAA+PROBE: NOT DETECTED
S AUREUS CPE NOSE QL NAA+PROBE: DETECTED

## 2024-04-24 NOTE — PROGRESS NOTES
Hemoglobin A1c  Order: [id number]  Status: Final result       Visible to patient: Yes (not seen)       Next appt: 05/16/2024 at 01:40 PM in Orthopedic Surgery (Froy Viramontes MD)       Dx: Pre-op evaluation    0 Result Notes      Component  Ref Range & Units 4/23/24 1249   Hemoglobin A1C  0 - 5.6 % 5.3   Comment: Reference Range  Normal       <5.7%  Prediabetes  5.7-6.4%  Diabetes     >6.4%   Estimated Avg Glucose  mg/dL 106   Resulting Agency [facility]              Specimen Collected: 04/23/24 12:49 EDT Last Resulted: 04/23/24 15:39 EDT

## 2024-04-25 NOTE — PROGRESS NOTES
24 1300   Treatment   Treatment Type Bedside spirometry   Breath Sounds   Breath Sounds Bilateral Clear   Oxygen Therapy/Pulse Ox   O2 Therapy Room air   Pulse 67   SpO2 98 %   Pulse Oximeter Device Mode Intermittent   $Pulse Oximeter $Spot check (single)     Initial respiratory Assessment completed with pt. Pt was interviewed and evaluated in [facility] prior to surgery.  Patient ID:  Shady Matt  [id number]  88 y.o.  1935  Surgeon: Dr. Viramontes  Date of Surgery: [unfilled]2024  Procedure: Total Right Hip Arthroplasty  Primary Care Physician: Leo Orr [phone]  Specialists:    Pt taught proper COUGH technique  IS REVIEWED WITH PT AS WELL AS BENEFITS OF USING IS IN SEDENTARY PTS.  DIAPHRAGMATIC BREATHING EXERCISE INSTRUCTIONS GIVEN    History of smokin PPD FOR 15 YEARS                 Quit date:         Secondhand smoke:FATHER    Past procedures with Oxygen desaturation or delayed awakening:DELAYED AWAKENING     Respiratory history:DENIES SOB                                BRIDGET- CPAP                                  Respiratory meds:  DENIES    FAMILY PRESENT:             WIFE   PAST SLEEP STUDY:        YES                     HX OF BRIDGET:                        YES                    BRIDGET assessment:     DANGERS OF UNTREATED BRIDGET EXPLAINED TO PT.                                          SLEEPS ON SIDE       &      BACK         &       STOMACH  PHYSICAL EXAM   Body mass index is 26.33 kg/m².   Vitals:    24 1313   BP: (!) 150/70   Pulse: 70   Temp: 98 °F (36.7 °C)   SpO2: 98%     Neck circumference:  44.5    cm    Loud snoring:                                                 YES             Witnessed apnea or wakening gasping or choking:             APNEA  Awakens with headaches:                                               DENIES  Morning or daytime tiredness/ sleepiness:                             TIRED  Dry mouth or sore throat in morning:            YES    
NICOTINE AND METABOLITES, URINE  Order: [id number]  Status: Final result       Visible to patient: Yes (not seen)       Next appt: 05/16/2024 at 01:40 PM in Orthopedic Surgery (Froy Viramontes MD)       Dx: Pre-op evaluation    0 Result Notes       Component  Ref Range & Units 4/23/24 1249 Resulting Agency   Cotinine Screen, Ur  Kiwemv=766 ng/mL Negative [facility]   Comment:    Comment [facility] [place]   Comment: (NOTE)  This analysis is performed by immunoassay. Positive findings are  unconfirmed analytical test results; if results do not support  expected clinical finding, confirmation by an alternate methodology  is recommended. Patient metabolic variables, specific drug chemistry,  and specimen characteristics can affect test outcome.  Technical consultation is available at [email or web address], or  call toll free [phone].  Performed At: [facility]  [address], [place] [id number]  Magdaleno Bonilla MD Ph:[phone]  Performed At: [facility]  [address], [place] [id number]  Meche Rey PhD Ph:[phone]              Specimen Collected: 04/23/24 12:49 EDT Last Resulted: 04/24/24 19:36 EDT           
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    EF 54% echo 7/2015    Thoracic aortic aneurysm (HCC) 06/28/2016    repair      Past Surgical History:   Procedure Laterality Date    CABG, ARTERY-VEIN, SINGLE  12/2014     along with thoracia aortic aneurysm and AVR    CARDIAC VALVE SURGERY  12/2014    CATARACT REMOVAL Bilateral 2009    with IOL    PROSTATECTOMY      TONSILLECTOMY      TOTAL HIP ARTHROPLASTY Left 2009    UVULOPALATOPHARYGOPLASTY      pt denies     Family History   Problem Relation Age of Onset    No Known Problems Brother     No Known Problems Sister     Parkinson's Disease Father     Lung Disease Brother     Stroke Brother     Cancer Mother         lung    Diabetes Brother       Social History     Tobacco Use    Smoking status: Former     Current packs/day: 1.00     Types: Cigarettes    Smokeless tobacco: Never    Tobacco comments:     Quit smoking: quit 50 years   Substance Use Topics    Alcohol use: No       Prior to Admission medications    Medication Sig Start Date End Date Taking? Authorizing Provider   tamsulosin (FLOMAX) 0.4 MG capsule Take 1 capsule by mouth daily  Patient not taking: Reported on 4/23/2024 3/27/24   Emeli Montero MD   nitrofurantoin, macrocrystal-monohydrate, (MACROBID) 100 MG capsule Take 1 capsule by mouth 2 times daily 3/27/24   Emeli Montero MD   metoprolol (LOPRESSOR) 100 MG tablet Take 1 tablet by mouth 2 times daily 3/5/24   Rivera Lazo DO   rosuvastatin (CRESTOR) 5 MG tablet TAKE TABLET BY MOUTH TWO TIMES A WEEK 11/29/23   Leo Orr MD   aspirin 81 MG EC tablet Take 1 tablet

## 2024-05-05 DIAGNOSIS — G89.18 POSTOPERATIVE PAIN: Primary | ICD-10-CM

## 2024-05-05 RX ORDER — ONDANSETRON 4 MG/1
4 TABLET, FILM COATED ORAL 3 TIMES DAILY PRN
Qty: 30 TABLET | Refills: 1 | Status: SHIPPED | OUTPATIENT
Start: 2024-05-05

## 2024-05-05 RX ORDER — OXYCODONE HYDROCHLORIDE 5 MG/1
10 TABLET ORAL EVERY 4 HOURS PRN
Qty: 60 TABLET | Refills: 0 | Status: SHIPPED | OUTPATIENT
Start: 2024-05-05 | End: 2024-05-12

## 2024-05-05 RX ORDER — ACETAMINOPHEN 500 MG
1000 TABLET ORAL EVERY 6 HOURS PRN
Qty: 180 TABLET | Refills: 2 | Status: SHIPPED | OUTPATIENT
Start: 2024-05-05

## 2024-05-05 RX ORDER — TIZANIDINE 4 MG/1
4 TABLET ORAL EVERY 8 HOURS PRN
Qty: 40 TABLET | Refills: 0 | Status: SHIPPED | OUTPATIENT
Start: 2024-05-05

## 2024-05-05 RX ORDER — SENNOSIDES A AND B 8.6 MG/1
1 TABLET, FILM COATED ORAL 2 TIMES DAILY
Qty: 30 TABLET | Refills: 2 | Status: SHIPPED | OUTPATIENT
Start: 2024-05-05

## 2024-05-05 RX ORDER — OMEPRAZOLE 40 MG/1
40 CAPSULE, DELAYED RELEASE ORAL DAILY
Qty: 30 CAPSULE | Refills: 0 | Status: SHIPPED | OUTPATIENT
Start: 2024-05-05

## 2024-05-05 RX ORDER — MELOXICAM 15 MG/1
15 TABLET ORAL DAILY
Qty: 30 TABLET | Refills: 2 | Status: SHIPPED | OUTPATIENT
Start: 2024-05-05

## 2024-05-05 RX ORDER — ASPIRIN 81 MG/1
81 TABLET ORAL 2 TIMES DAILY
Qty: 60 TABLET | Refills: 0 | Status: SHIPPED | OUTPATIENT
Start: 2024-05-05

## 2024-05-06 DIAGNOSIS — M21.951 ACQUIRED DEFORMITY OF RIGHT HIP: ICD-10-CM

## 2024-05-06 DIAGNOSIS — M16.11 PRIMARY OSTEOARTHRITIS OF RIGHT HIP: Primary | ICD-10-CM

## 2024-05-16 ENCOUNTER — ANESTHESIA EVENT (OUTPATIENT)
Dept: SURGERY | Age: 89
End: 2024-05-16
Payer: MEDICARE

## 2024-05-16 ENCOUNTER — OFFICE VISIT (OUTPATIENT)
Dept: ORTHOPEDIC SURGERY | Age: 89
End: 2024-05-16

## 2024-05-16 DIAGNOSIS — M25.551 RIGHT HIP PAIN: Primary | ICD-10-CM

## 2024-05-16 NOTE — PROGRESS NOTES
Patient ID:  Shady Matt  [id number]  88 y.o.  1935    Today: May 16, 2024       CC:  Right hip pain    HPI:   The patient has end stage arthritis of the right hip. The patient was evaluated and examined in the office prior to today and was found to have a history on physical exam consistent with intra-articular pathology of the right hip. Patient complains of anterior groin pain predominately. Pain occurs during activity. Patient has difficulty putting on socks/shoes and has notable pain when getting up from a chair and getting out of a car. Patient does not complain of significant lateral hip pain or radicular pain down the leg. 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    Chest pain 06/28/2016    pt denies any CP or SOB    Chronic kidney disease, stage 3 (HCC)     hx of increased creatinine, renal insuff    CKD (chronic kidney disease) stage 3, GFR 30-59 ml/min ([facility]) 4/8/2015    Coronary atherosclerosis of native coronary vessel 6/28/2016    Digital mucinous cyst of finger of right hand     Disease of lung 6/28/2016    Dyslipidemia 4/8/2015    Dyspnea 6/28/2016    Encounter for long-term (current) use of other high-risk medications 11/20/2015    Exanthem 11/20/2015    Former cigarette smoker     GERD (gastroesophageal reflux disease)     medication    H/O heart artery stent     X1    History of peptic ulcer disease 1990's    History of prostate cancer 10/2009    prostatectomy    [place] (hard of hearing)     bilateral hearing aides    HTN (hypertension)

## 2024-05-17 ENCOUNTER — HOME HEALTH ADMISSION (OUTPATIENT)
Dept: HOME HEALTH SERVICES | Facility: HOME HEALTH | Age: 89
End: 2024-05-17
Payer: MEDICARE

## 2024-05-17 ENCOUNTER — ANESTHESIA (OUTPATIENT)
Dept: SURGERY | Age: 89
End: 2024-05-17
Payer: MEDICARE

## 2024-05-17 ENCOUNTER — APPOINTMENT (OUTPATIENT)
Dept: GENERAL RADIOLOGY | Age: 89
End: 2024-05-17
Attending: ORTHOPAEDIC SURGERY
Payer: MEDICARE

## 2024-05-17 ENCOUNTER — HOSPITAL ENCOUNTER (OUTPATIENT)
Age: 89
Discharge: HOME | End: 2024-05-18
Attending: ORTHOPAEDIC SURGERY | Admitting: ORTHOPAEDIC SURGERY
Payer: MEDICARE

## 2024-05-17 PROBLEM — M16.11 OSTEOARTHRITIS OF RIGHT HIP, UNSPECIFIED OSTEOARTHRITIS TYPE: Status: ACTIVE | Noted: 2024-05-17

## 2024-05-17 PROCEDURE — 2580000003 HC RX 258: Performed by: ORTHOPAEDIC SURGERY

## 2024-05-17 PROCEDURE — 2580000003 HC RX 258: Performed by: ANESTHESIOLOGY

## 2024-05-17 PROCEDURE — 6360000002 HC RX W HCPCS: Performed by: NURSE PRACTITIONER

## 2024-05-17 PROCEDURE — 3600000005 HC SURGERY LEVEL 5 BASE: Performed by: ORTHOPAEDIC SURGERY

## 2024-05-17 PROCEDURE — 6370000000 HC RX 637 (ALT 250 FOR IP): Performed by: NURSE PRACTITIONER

## 2024-05-17 PROCEDURE — 6360000002 HC RX W HCPCS: Performed by: NURSE ANESTHETIST, CERTIFIED REGISTERED

## 2024-05-17 PROCEDURE — 2500000003 HC RX 250 WO HCPCS: Performed by: NURSE ANESTHETIST, CERTIFIED REGISTERED

## 2024-05-17 PROCEDURE — 97530 THERAPEUTIC ACTIVITIES: CPT

## 2024-05-17 PROCEDURE — 6360000002 HC RX W HCPCS: Performed by: ORTHOPAEDIC SURGERY

## 2024-05-17 PROCEDURE — 72170 X-RAY EXAM OF PELVIS: CPT

## 2024-05-17 PROCEDURE — 6370000000 HC RX 637 (ALT 250 FOR IP): Performed by: ANESTHESIOLOGY

## 2024-05-17 PROCEDURE — 94761 N-INVAS EAR/PLS OXIMETRY MLT: CPT

## 2024-05-17 PROCEDURE — 7100000001 HC PACU RECOVERY - ADDTL 15 MIN: Performed by: ORTHOPAEDIC SURGERY

## 2024-05-17 PROCEDURE — 2720000010 HC SURG SUPPLY STERILE: Performed by: ORTHOPAEDIC SURGERY

## 2024-05-17 PROCEDURE — 3700000000 HC ANESTHESIA ATTENDED CARE: Performed by: ORTHOPAEDIC SURGERY

## 2024-05-17 PROCEDURE — 2700000000 HC OXYGEN THERAPY PER DAY

## 2024-05-17 PROCEDURE — 97110 THERAPEUTIC EXERCISES: CPT

## 2024-05-17 PROCEDURE — 3700000001 HC ADD 15 MINUTES (ANESTHESIA): Performed by: ORTHOPAEDIC SURGERY

## 2024-05-17 PROCEDURE — A4216 STERILE WATER/SALINE, 10 ML: HCPCS | Performed by: ORTHOPAEDIC SURGERY

## 2024-05-17 PROCEDURE — 2709999900 HC NON-CHARGEABLE SUPPLY: Performed by: ORTHOPAEDIC SURGERY

## 2024-05-17 PROCEDURE — 3600000015 HC SURGERY LEVEL 5 ADDTL 15MIN: Performed by: ORTHOPAEDIC SURGERY

## 2024-05-17 PROCEDURE — 97161 PT EVAL LOW COMPLEX 20 MIN: CPT

## 2024-05-17 PROCEDURE — 27130 TOTAL HIP ARTHROPLASTY: CPT | Performed by: ORTHOPAEDIC SURGERY

## 2024-05-17 PROCEDURE — 2580000003 HC RX 258: Performed by: NURSE ANESTHETIST, CERTIFIED REGISTERED

## 2024-05-17 PROCEDURE — 2580000003 HC RX 258: Performed by: NURSE PRACTITIONER

## 2024-05-17 PROCEDURE — 20985 CPTR-ASST DIR MS PX: CPT | Performed by: ORTHOPAEDIC SURGERY

## 2024-05-17 PROCEDURE — C1776 JOINT DEVICE (IMPLANTABLE): HCPCS | Performed by: ORTHOPAEDIC SURGERY

## 2024-05-17 PROCEDURE — 7100000000 HC PACU RECOVERY - FIRST 15 MIN: Performed by: ORTHOPAEDIC SURGERY

## 2024-05-17 PROCEDURE — 97165 OT EVAL LOW COMPLEX 30 MIN: CPT

## 2024-05-17 PROCEDURE — 97535 SELF CARE MNGMENT TRAINING: CPT

## 2024-05-17 PROCEDURE — 6360000002 HC RX W HCPCS: Performed by: ANESTHESIOLOGY

## 2024-05-17 DEVICE — HEAD FEM DIA36MM +5MM OFFSET HIP BIOLOX DELT CERAMIC TAPR: Type: IMPLANTABLE DEVICE | Site: HIP | Status: FUNCTIONAL

## 2024-05-17 DEVICE — SCREW BNE L35MM DIA65MM LO PROF HEX TRIDENT LL: Type: IMPLANTABLE DEVICE | Site: HIP | Status: FUNCTIONAL

## 2024-05-17 DEVICE — INSERT ACET E 10 DEG 36 MM HIP X3 TRIDENT: Type: IMPLANTABLE DEVICE | Site: HIP | Status: FUNCTIONAL

## 2024-05-17 DEVICE — COMPONENT TOT HIP CAPPED LNR POLYETH H2STRYKER] STRYKER CORP]: Type: IMPLANTABLE DEVICE | Site: HIP | Status: FUNCTIONAL

## 2024-05-17 DEVICE — SHELL ACET SZ E DIA54MM 5 CLUS H TRITANIUM PRESSFIT PRI: Type: IMPLANTABLE DEVICE | Site: HIP | Status: FUNCTIONAL

## 2024-05-17 DEVICE — STEM FEM SZ 4 L105MM NK L35MM 42MM OFFSET 127DEG HIP TI: Type: IMPLANTABLE DEVICE | Site: HIP | Status: FUNCTIONAL

## 2024-05-17 RX ORDER — ROPIVACAINE HYDROCHLORIDE 2 MG/ML
INJECTION, SOLUTION EPIDURAL; INFILTRATION; PERINEURAL PRN
Status: DISCONTINUED | OUTPATIENT
Start: 2024-05-17 | End: 2024-05-17 | Stop reason: ALTCHOICE

## 2024-05-17 RX ORDER — DIPHENHYDRAMINE HYDROCHLORIDE 50 MG/ML
25 INJECTION INTRAMUSCULAR; INTRAVENOUS EVERY 6 HOURS PRN
Status: DISCONTINUED | OUTPATIENT
Start: 2024-05-17 | End: 2024-05-18 | Stop reason: HOSPADM

## 2024-05-17 RX ORDER — VASOPRESSIN 20 U/ML
INJECTION PARENTERAL PRN
Status: DISCONTINUED | OUTPATIENT
Start: 2024-05-17 | End: 2024-05-17 | Stop reason: SDUPTHER

## 2024-05-17 RX ORDER — DIPHENHYDRAMINE HCL 25 MG
25 CAPSULE ORAL EVERY 6 HOURS PRN
Status: DISCONTINUED | OUTPATIENT
Start: 2024-05-17 | End: 2024-05-18 | Stop reason: HOSPADM

## 2024-05-17 RX ORDER — ACETAMINOPHEN 500 MG
1000 TABLET ORAL ONCE
Status: COMPLETED | OUTPATIENT
Start: 2024-05-17 | End: 2024-05-17

## 2024-05-17 RX ORDER — OXYCODONE HYDROCHLORIDE 5 MG/1
10 TABLET ORAL EVERY 4 HOURS PRN
Status: DISCONTINUED | OUTPATIENT
Start: 2024-05-17 | End: 2024-05-18 | Stop reason: HOSPADM

## 2024-05-17 RX ORDER — METOPROLOL TARTRATE 100 MG/1
100 TABLET ORAL 2 TIMES DAILY
Status: DISCONTINUED | OUTPATIENT
Start: 2024-05-17 | End: 2024-05-17

## 2024-05-17 RX ORDER — SODIUM CHLORIDE 9 MG/ML
INJECTION, SOLUTION INTRAVENOUS CONTINUOUS
Status: DISCONTINUED | OUTPATIENT
Start: 2024-05-17 | End: 2024-05-18 | Stop reason: HOSPADM

## 2024-05-17 RX ORDER — HYDROMORPHONE HYDROCHLORIDE 1 MG/ML
0.25 INJECTION, SOLUTION INTRAMUSCULAR; INTRAVENOUS; SUBCUTANEOUS
Status: DISCONTINUED | OUTPATIENT
Start: 2024-05-17 | End: 2024-05-18 | Stop reason: HOSPADM

## 2024-05-17 RX ORDER — PROCHLORPERAZINE EDISYLATE 5 MG/ML
5 INJECTION INTRAMUSCULAR; INTRAVENOUS
Status: DISCONTINUED | OUTPATIENT
Start: 2024-05-17 | End: 2024-05-17 | Stop reason: HOSPADM

## 2024-05-17 RX ORDER — OXYCODONE HYDROCHLORIDE 5 MG/1
10 TABLET ORAL PRN
Status: DISCONTINUED | OUTPATIENT
Start: 2024-05-17 | End: 2024-05-17 | Stop reason: HOSPADM

## 2024-05-17 RX ORDER — PANTOPRAZOLE SODIUM 40 MG/1
40 TABLET, DELAYED RELEASE ORAL
Status: DISCONTINUED | OUTPATIENT
Start: 2024-05-18 | End: 2024-05-18 | Stop reason: HOSPADM

## 2024-05-17 RX ORDER — NALOXONE HYDROCHLORIDE 0.4 MG/ML
0.4 INJECTION, SOLUTION INTRAMUSCULAR; INTRAVENOUS; SUBCUTANEOUS PRN
Status: DISCONTINUED | OUTPATIENT
Start: 2024-05-17 | End: 2024-05-18 | Stop reason: HOSPADM

## 2024-05-17 RX ORDER — SODIUM CHLORIDE 9 MG/ML
INJECTION, SOLUTION INTRAVENOUS PRN
Status: DISCONTINUED | OUTPATIENT
Start: 2024-05-17 | End: 2024-05-18 | Stop reason: HOSPADM

## 2024-05-17 RX ORDER — SODIUM CHLORIDE 0.9 % (FLUSH) 0.9 %
5-40 SYRINGE (ML) INJECTION PRN
Status: DISCONTINUED | OUTPATIENT
Start: 2024-05-17 | End: 2024-05-17 | Stop reason: HOSPADM

## 2024-05-17 RX ORDER — METOPROLOL TARTRATE 100 MG/1
100 TABLET ORAL 2 TIMES DAILY
Status: DISCONTINUED | OUTPATIENT
Start: 2024-05-18 | End: 2024-05-18 | Stop reason: HOSPADM

## 2024-05-17 RX ORDER — ONDANSETRON 2 MG/ML
4 INJECTION INTRAMUSCULAR; INTRAVENOUS
Status: DISCONTINUED | OUTPATIENT
Start: 2024-05-17 | End: 2024-05-17 | Stop reason: HOSPADM

## 2024-05-17 RX ORDER — ALBUTEROL SULFATE 2.5 MG/3ML
2.5 SOLUTION RESPIRATORY (INHALATION) EVERY 6 HOURS PRN
Status: DISCONTINUED | OUTPATIENT
Start: 2024-05-17 | End: 2024-05-18 | Stop reason: HOSPADM

## 2024-05-17 RX ORDER — OXYCODONE HYDROCHLORIDE 5 MG/1
5 TABLET ORAL EVERY 4 HOURS PRN
Status: DISCONTINUED | OUTPATIENT
Start: 2024-05-17 | End: 2024-05-18 | Stop reason: HOSPADM

## 2024-05-17 RX ORDER — ACETAMINOPHEN 500 MG
1000 TABLET ORAL EVERY 6 HOURS
Status: DISCONTINUED | OUTPATIENT
Start: 2024-05-17 | End: 2024-05-18 | Stop reason: HOSPADM

## 2024-05-17 RX ORDER — SODIUM CHLORIDE 0.9 % (FLUSH) 0.9 %
5-40 SYRINGE (ML) INJECTION EVERY 12 HOURS SCHEDULED
Status: DISCONTINUED | OUTPATIENT
Start: 2024-05-17 | End: 2024-05-17 | Stop reason: SDUPTHER

## 2024-05-17 RX ORDER — SODIUM CHLORIDE 9 MG/ML
INJECTION, SOLUTION INTRAVENOUS PRN
Status: DISCONTINUED | OUTPATIENT
Start: 2024-05-17 | End: 2024-05-17 | Stop reason: SDUPTHER

## 2024-05-17 RX ORDER — DEXAMETHASONE SODIUM PHOSPHATE 10 MG/ML
INJECTION INTRAMUSCULAR; INTRAVENOUS PRN
Status: DISCONTINUED | OUTPATIENT
Start: 2024-05-17 | End: 2024-05-17 | Stop reason: SDUPTHER

## 2024-05-17 RX ORDER — SODIUM CHLORIDE 9 MG/ML
INJECTION, SOLUTION INTRAVENOUS PRN
Status: DISCONTINUED | OUTPATIENT
Start: 2024-05-17 | End: 2024-05-17 | Stop reason: HOSPADM

## 2024-05-17 RX ORDER — ONDANSETRON 4 MG/1
4 TABLET, ORALLY DISINTEGRATING ORAL EVERY 8 HOURS PRN
Status: DISCONTINUED | OUTPATIENT
Start: 2024-05-17 | End: 2024-05-18 | Stop reason: HOSPADM

## 2024-05-17 RX ORDER — EPHEDRINE SULFATE/0.9% NACL/PF 50 MG/5 ML
SYRINGE (ML) INTRAVENOUS PRN
Status: DISCONTINUED | OUTPATIENT
Start: 2024-05-17 | End: 2024-05-17 | Stop reason: SDUPTHER

## 2024-05-17 RX ORDER — PROPOFOL 10 MG/ML
INJECTION, EMULSION INTRAVENOUS CONTINUOUS PRN
Status: DISCONTINUED | OUTPATIENT
Start: 2024-05-17 | End: 2024-05-17 | Stop reason: SDUPTHER

## 2024-05-17 RX ORDER — SODIUM CHLORIDE 0.9 % (FLUSH) 0.9 %
5-40 SYRINGE (ML) INJECTION EVERY 12 HOURS SCHEDULED
Status: DISCONTINUED | OUTPATIENT
Start: 2024-05-17 | End: 2024-05-18 | Stop reason: HOSPADM

## 2024-05-17 RX ORDER — ONDANSETRON 4 MG/1
8 TABLET, ORALLY DISINTEGRATING ORAL EVERY 8 HOURS PRN
Status: DISCONTINUED | OUTPATIENT
Start: 2024-05-17 | End: 2024-05-18 | Stop reason: HOSPADM

## 2024-05-17 RX ORDER — GLYCOPYRROLATE 0.2 MG/ML
INJECTION INTRAMUSCULAR; INTRAVENOUS PRN
Status: DISCONTINUED | OUTPATIENT
Start: 2024-05-17 | End: 2024-05-17 | Stop reason: SDUPTHER

## 2024-05-17 RX ORDER — SENNA AND DOCUSATE SODIUM 50; 8.6 MG/1; MG/1
1 TABLET, FILM COATED ORAL 2 TIMES DAILY
Status: DISCONTINUED | OUTPATIENT
Start: 2024-05-17 | End: 2024-05-18 | Stop reason: HOSPADM

## 2024-05-17 RX ORDER — SODIUM CHLORIDE 9 MG/ML
INJECTION, SOLUTION INTRAMUSCULAR; INTRAVENOUS; SUBCUTANEOUS PRN
Status: DISCONTINUED | OUTPATIENT
Start: 2024-05-17 | End: 2024-05-17 | Stop reason: ALTCHOICE

## 2024-05-17 RX ORDER — OXYCODONE HYDROCHLORIDE 5 MG/1
5 TABLET ORAL PRN
Status: DISCONTINUED | OUTPATIENT
Start: 2024-05-17 | End: 2024-05-17 | Stop reason: HOSPADM

## 2024-05-17 RX ORDER — TIZANIDINE 2 MG/1
2 TABLET ORAL 3 TIMES DAILY
Status: DISCONTINUED | OUTPATIENT
Start: 2024-05-17 | End: 2024-05-18 | Stop reason: HOSPADM

## 2024-05-17 RX ORDER — ONDANSETRON 2 MG/ML
4 INJECTION INTRAMUSCULAR; INTRAVENOUS EVERY 6 HOURS PRN
Status: DISCONTINUED | OUTPATIENT
Start: 2024-05-17 | End: 2024-05-18 | Stop reason: HOSPADM

## 2024-05-17 RX ORDER — ASPIRIN 81 MG/1
81 TABLET ORAL 2 TIMES DAILY
Status: DISCONTINUED | OUTPATIENT
Start: 2024-05-17 | End: 2024-05-18 | Stop reason: HOSPADM

## 2024-05-17 RX ORDER — SODIUM CHLORIDE, SODIUM LACTATE, POTASSIUM CHLORIDE, CALCIUM CHLORIDE 600; 310; 30; 20 MG/100ML; MG/100ML; MG/100ML; MG/100ML
INJECTION, SOLUTION INTRAVENOUS CONTINUOUS
Status: DISCONTINUED | OUTPATIENT
Start: 2024-05-17 | End: 2024-05-17 | Stop reason: HOSPADM

## 2024-05-17 RX ORDER — NALOXONE HYDROCHLORIDE 0.4 MG/ML
INJECTION, SOLUTION INTRAMUSCULAR; INTRAVENOUS; SUBCUTANEOUS PRN
Status: DISCONTINUED | OUTPATIENT
Start: 2024-05-17 | End: 2024-05-17 | Stop reason: HOSPADM

## 2024-05-17 RX ORDER — SODIUM CHLORIDE 0.9 % (FLUSH) 0.9 %
5-40 SYRINGE (ML) INJECTION EVERY 12 HOURS SCHEDULED
Status: DISCONTINUED | OUTPATIENT
Start: 2024-05-17 | End: 2024-05-17 | Stop reason: HOSPADM

## 2024-05-17 RX ORDER — EPINEPHRINE 1 MG/ML(1)
AMPUL (ML) INJECTION PRN
Status: DISCONTINUED | OUTPATIENT
Start: 2024-05-17 | End: 2024-05-17 | Stop reason: SDUPTHER

## 2024-05-17 RX ORDER — LIDOCAINE HYDROCHLORIDE 10 MG/ML
1 INJECTION, SOLUTION INFILTRATION; PERINEURAL
Status: DISCONTINUED | OUTPATIENT
Start: 2024-05-17 | End: 2024-05-17 | Stop reason: HOSPADM

## 2024-05-17 RX ORDER — SODIUM CHLORIDE 0.9 % (FLUSH) 0.9 %
5-40 SYRINGE (ML) INJECTION PRN
Status: DISCONTINUED | OUTPATIENT
Start: 2024-05-17 | End: 2024-05-17 | Stop reason: SDUPTHER

## 2024-05-17 RX ORDER — TRANEXAMIC ACID 650 MG/1
1300 TABLET ORAL
Status: COMPLETED | OUTPATIENT
Start: 2024-05-17 | End: 2024-05-17

## 2024-05-17 RX ORDER — ROSUVASTATIN CALCIUM 5 MG/1
5 TABLET, COATED ORAL
Status: DISCONTINUED | OUTPATIENT
Start: 2024-05-20 | End: 2024-05-18 | Stop reason: HOSPADM

## 2024-05-17 RX ORDER — ONDANSETRON 2 MG/ML
INJECTION INTRAMUSCULAR; INTRAVENOUS PRN
Status: DISCONTINUED | OUTPATIENT
Start: 2024-05-17 | End: 2024-05-17 | Stop reason: SDUPTHER

## 2024-05-17 RX ORDER — SODIUM CHLORIDE 0.9 % (FLUSH) 0.9 %
5-40 SYRINGE (ML) INJECTION PRN
Status: DISCONTINUED | OUTPATIENT
Start: 2024-05-17 | End: 2024-05-18 | Stop reason: HOSPADM

## 2024-05-17 RX ORDER — DEXAMETHASONE SODIUM PHOSPHATE 10 MG/ML
10 INJECTION INTRAMUSCULAR; INTRAVENOUS ONCE
Status: DISCONTINUED | OUTPATIENT
Start: 2024-05-18 | End: 2024-05-18 | Stop reason: HOSPADM

## 2024-05-17 RX ADMIN — PHENYLEPHRINE HYDROCHLORIDE 100 MCG: 0.1 INJECTION, SOLUTION INTRAVENOUS at 10:26

## 2024-05-17 RX ADMIN — TRANEXAMIC ACID 1300 MG: 650 TABLET ORAL at 15:31

## 2024-05-17 RX ADMIN — PROPOFOL 25 MCG/KG/MIN: 10 INJECTION, EMULSION INTRAVENOUS at 09:58

## 2024-05-17 RX ADMIN — GLYCOPYRROLATE 0.2 MG: 0.2 INJECTION INTRAMUSCULAR; INTRAVENOUS at 10:35

## 2024-05-17 RX ADMIN — PHENYLEPHRINE HYDROCHLORIDE 100 MCG: 0.1 INJECTION, SOLUTION INTRAVENOUS at 10:07

## 2024-05-17 RX ADMIN — VASOPRESSIN 1 UNITS: 20 INJECTION PARENTERAL at 10:50

## 2024-05-17 RX ADMIN — Medication 2000 MG: at 09:44

## 2024-05-17 RX ADMIN — VASOPRESSIN 1 UNITS: 20 INJECTION PARENTERAL at 10:48

## 2024-05-17 RX ADMIN — PHENYLEPHRINE HYDROCHLORIDE 10 MCG/MIN: 10 INJECTION INTRAVENOUS at 10:24

## 2024-05-17 RX ADMIN — ACETAMINOPHEN 1000 MG: 500 TABLET, FILM COATED ORAL at 17:51

## 2024-05-17 RX ADMIN — DEXAMETHASONE SODIUM PHOSPHATE 10 MG: 10 INJECTION INTRAMUSCULAR; INTRAVENOUS at 10:07

## 2024-05-17 RX ADMIN — ASPIRIN 81 MG: 81 TABLET, COATED ORAL at 21:36

## 2024-05-17 RX ADMIN — CEFAZOLIN 2000 MG: 10 INJECTION, POWDER, FOR SOLUTION INTRAVENOUS at 17:52

## 2024-05-17 RX ADMIN — Medication 10 MG: at 10:26

## 2024-05-17 RX ADMIN — SODIUM CHLORIDE, POTASSIUM CHLORIDE, SODIUM LACTATE AND CALCIUM CHLORIDE 75 ML/HR: 600; 310; 30; 20 INJECTION, SOLUTION INTRAVENOUS at 09:42

## 2024-05-17 RX ADMIN — Medication 1000 MG: at 09:55

## 2024-05-17 RX ADMIN — PHENYLEPHRINE HYDROCHLORIDE 100 MCG: 0.1 INJECTION, SOLUTION INTRAVENOUS at 10:32

## 2024-05-17 RX ADMIN — VASOPRESSIN 1 UNITS: 20 INJECTION PARENTERAL at 10:56

## 2024-05-17 RX ADMIN — PHENYLEPHRINE HYDROCHLORIDE 100 MCG: 0.1 INJECTION, SOLUTION INTRAVENOUS at 10:05

## 2024-05-17 RX ADMIN — Medication 5 MG: at 10:07

## 2024-05-17 RX ADMIN — Medication 10 MG: at 10:12

## 2024-05-17 RX ADMIN — MEPIVACAINE HYDROCHLORIDE 60 MG: 20 INJECTION, SOLUTION EPIDURAL; INFILTRATION at 09:53

## 2024-05-17 RX ADMIN — Medication 1000 MG: at 11:10

## 2024-05-17 RX ADMIN — SODIUM CHLORIDE, PRESERVATIVE FREE 10 ML: 5 INJECTION INTRAVENOUS at 21:37

## 2024-05-17 RX ADMIN — SENNOSIDES AND DOCUSATE SODIUM 1 TABLET: 50; 8.6 TABLET ORAL at 21:36

## 2024-05-17 RX ADMIN — PHENYLEPHRINE HYDROCHLORIDE 100 MCG: 0.1 INJECTION, SOLUTION INTRAVENOUS at 10:22

## 2024-05-17 RX ADMIN — VASOPRESSIN 1 UNITS: 20 INJECTION PARENTERAL at 10:52

## 2024-05-17 RX ADMIN — PHENYLEPHRINE HYDROCHLORIDE 100 MCG: 0.1 INJECTION, SOLUTION INTRAVENOUS at 10:18

## 2024-05-17 RX ADMIN — PHENYLEPHRINE HYDROCHLORIDE 100 MCG: 0.1 INJECTION, SOLUTION INTRAVENOUS at 10:12

## 2024-05-17 RX ADMIN — ONDANSETRON 4 MG: 2 INJECTION INTRAMUSCULAR; INTRAVENOUS at 09:55

## 2024-05-17 RX ADMIN — EPINEPHRINE 10 MCG: 1 INJECTION, SOLUTION, CONCENTRATE INTRAVENOUS at 10:43

## 2024-05-17 RX ADMIN — VASOPRESSIN 1 UNITS: 20 INJECTION PARENTERAL at 10:54

## 2024-05-17 RX ADMIN — TRANEXAMIC ACID 1300 MG: 650 TABLET ORAL at 13:16

## 2024-05-17 RX ADMIN — TIZANIDINE 2 MG: 2 TABLET ORAL at 21:36

## 2024-05-17 RX ADMIN — Medication 10 MG: at 10:32

## 2024-05-17 RX ADMIN — ACETAMINOPHEN 1000 MG: 500 TABLET, FILM COATED ORAL at 09:43

## 2024-05-17 RX ADMIN — SODIUM CHLORIDE: 9 INJECTION, SOLUTION INTRAVENOUS at 12:33

## 2024-05-17 ASSESSMENT — PAIN SCALES - GENERAL: PAINLEVEL_OUTOF10: 0

## 2024-05-17 ASSESSMENT — PAIN DESCRIPTION - DESCRIPTORS
DESCRIPTORS: SHARP;SHOOTING;ACHING
DESCRIPTORS: ACHING

## 2024-05-17 ASSESSMENT — PAIN - FUNCTIONAL ASSESSMENT: PAIN_FUNCTIONAL_ASSESSMENT: 0-10

## 2024-05-17 NOTE — PROGRESS NOTES
4 Eyes Skin Assessment     NAME:  Shady Matt  YOB: 1935  MEDICAL RECORD NUMBER:  [id number]    The patient is being assessed for  Post-Op Surgical    I agree that at least one RN has performed a thorough Head to Toe Skin Assessment on the patient. ALL assessment sites listed below have been assessed.      Areas assessed by both nurses:    Head, Face, Ears, Shoulders, Back, Chest, Arms, Elbows, Hands, Sacrum. Buttock, Coccyx, Ischium, Legs. Feet and Heels, and Under Medical Devices         Does the Patient have a Wound? No noted wound(s)       Cristhian Prevention initiated by RN: No  Wound Care Orders initiated by RN: No    Pressure Injury (Stage 3,4, Unstageable, DTI, NWPT, and Complex wounds) if present, place Wound referral order by RN under : No    New Ostomies, if present place, Ostomy referral order under : No     Nurse 1 eSignature: Electronically signed by Phoenix M Chimato, RN on 5/17/24 at 12:48 PM EDT    **SHARE this note so that the co-signing nurse can place an eSignature**    Nurse 2 eSignature: Electronically signed by Mary Jane Mccrary RN on 5/17/24 at 1:59 PM EDT    Spoke with pharmacy and it was confirmed for script to be dispensed on 1/6/2021    Left VM for patient to call back to confirm the dose she is taking and sent a new scripts if she needs one.     Per Dr. Das for patient to take the dose she can tolerate, not more than 2 tablets two times daily

## 2024-05-17 NOTE — H&P
Patient ID:  Shady Matt  [id number]  88 y.o.  1935    Today: May 17, 2024       CC:  Right hip pain    HPI:   The patient has end stage arthritis of the right hip. The patient was evaluated and examined in the office prior to today and was found to have a history on physical exam consistent with intra-articular pathology of the right hip. Patient complains of anterior groin pain predominately. Pain occurs during activity. Patient has difficulty putting on socks/shoes and has notable pain when getting up from a chair and getting out of a car. Patient does not complain of significant lateral hip pain or radicular pain down the leg. 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    Chest pain 06/28/2016    pt denies any CP or SOB    Chronic kidney disease, stage 3 (HCC)     hx of increased creatinine, renal insuff    CKD (chronic kidney disease) stage 3, GFR 30-59 ml/min ([facility]) 4/8/2015    Coronary atherosclerosis of native coronary vessel 6/28/2016    Digital mucinous cyst of finger of right hand     Disease of lung 6/28/2016    Dyslipidemia 4/8/2015    Dyspnea 6/28/2016    Encounter for long-term (current) use of other high-risk medications 11/20/2015    Exanthem 11/20/2015    Former cigarette smoker     GERD (gastroesophageal reflux disease)     medication    H/O heart artery stent     X1    History of peptic ulcer disease 1990's    History of prostate cancer 10/2009    prostatectomy    [place] (hard of hearing)     bilateral hearing aides    HTN (hypertension)  mouth every 8 hours as needed (spasm) 5/5/24   Froy Viramontes MD   tamsulosin (FLOMAX) 0.4 MG capsule Take 1 capsule by mouth daily  Patient not taking: Reported on 4/23/2024 3/27/24   Emeli Montero MD   nitrofurantoin, macrocrystal-monohydrate, (MACROBID) 100 MG capsule Take 1 capsule by mouth 2 times daily 3/27/24   Emeli Montero MD   metoprolol (LOPRESSOR) 100 MG tablet Take 1 tablet by mouth 2 times daily 3/5/24   Rivera Lazo DO   rosuvastatin (CRESTOR) 5 MG tablet TAKE TABLET BY MOUTH TWO TIMES A WEEK 11/29/23   Leo Orr MD   aspirin 81 MG EC tablet Take 1 tablet by mouth three times a week    Emeli Montero MD   Cholecalciferol (VITAMIN D) 50 MCG (2000 UT) CAPS capsule Take by mouth in the morning and at bedtime    Emeli Montero MD   fluticasone (CUTIVATE) 0.05 % cream APPLY TO AFFECTED AREA(S) TOPICALLY TWO TIMES A DAY 2/17/23   Leo Orr MD   Multiple Vitamins-Minerals (PRESERVISION AREDS 2+MULTI VIT PO) Take by mouth daily    Emeli Montero MD   Multiple Vitamin (MULTIVITAMIN PO) Take by mouth daily    Emeli Montero MD   acetaminophen (TYLENOL) 500 MG tablet Take 1 tablet by mouth every 6 hours as needed for Pain    Emeli Montero MD   omeprazole (PRILOSEC) 20 MG delayed release capsule Take 1 capsule by mouth Daily    Automatic Reconciliation, Ar       Family History:     Family History   Problem Relation Age of Onset    No Known Problems Brother     No Known Problems Sister     Parkinson's Disease Father     Lung Disease Brother     Stroke Brother     Cancer Mother         lung    Diabetes Brother        Social History:      Social History     Tobacco Use    Smoking status: Former     Current packs/day: 1.00     Types: Cigarettes    Smokeless tobacco: Never    Tobacco comments:     Quit smoking: quit 50 years   Substance Use Topics    Alcohol use: No         Allergies:      Allergies   Allergen Reactions

## 2024-05-17 NOTE — ANESTHESIA POSTPROCEDURE EVALUATION
Department of Anesthesiology  Postprocedure Note    Patient: Shady Matt  MRN: [id number]  YOB: 1935  Date of evaluation: 5/17/2024    Procedure Summary       Date: 05/17/24 [place] [facility] [place] [facility] [place] [facility] [place] [facility] [place]    Anesthesia Start: 0948 Anesthesia Stop: 1134    Procedure: RIGHT ROBOTIC HIP TOTAL ARTHROPLASTY, robotic (Right: Hip) Diagnosis:       Primary osteoarthritis of right hip      (Primary osteoarthritis of right hip [M16.11])    Surgeons: Froy Viramontes MD Responsible Provider: SULLY Prieto MD    Anesthesia Type: spinal ASA Status: 3            Anesthesia Type: No value filed.    Vitor Phase I: Vitor Score: 6    Vitor Phase II:      Anesthesia Post Evaluation    Patient location during evaluation: PACU  Patient participation: complete - patient participated  Level of consciousness: awake and alert  Airway patency: patent  Nausea & Vomiting: no nausea and no vomiting  Cardiovascular status: hemodynamically stable  Respiratory status: acceptable  Hydration status: euvolemic  Comments: Blood pressure (!) 104/45, pulse 81, temperature 98.2 °F (36.8 °C), resp. rate 20, height 1.778 m (5' 10\"), weight 82.7 kg (182 lb 6.4 oz), SpO2 94 %.    No apparent anesthetic complications.  Pt stable for discharge from PACU  Multimodal analgesia pain management approach  Pain management: adequate    No notable events documented.

## 2024-05-17 NOTE — CARE COORDINATION
Patient is a 88 y.o. year old male admitted for Right ARMIDA . Patient plans to return home on discharge. Order received to arrange home health. Patient without preference towards agency. Referral sent to [facility].  Patient requesting we arrange a walker and bedside commode. Pt without preference towards provider. Referral sent to [facility]. Pt made payment arrangements directly with [facility]: [facility] as [place] does not meet criteria for coverage.  Equipment delivered to the hospital room prior to discharge. Will follow until discharge.      05/17/24 3287   Service Assessment   Patient Orientation Alert and Oriented   Cognition Alert   History Provided By Patient   Services At/After Discharge   Transition of Care Consult (CM Consult) Home Health   Internal Home Health Yes   Services At/After Discharge Home Health;PT   Mode of Transport at Discharge Self   Confirm Follow Up Transport Self   Condition of Participation: Discharge Planning   The Plan for Transition of Care is related to the following treatment goals: improve mobility   The Patient and/or Patient Representative was provided with a Choice of Provider? Patient   The Patient and/Or Patient Representative agree with the Discharge Plan? Yes   Freedom of Choice list was provided with basic dialogue that supports the patient's individualized plan of care/goals, treatment preferences, and shares the quality data associated with the providers?  Yes

## 2024-05-17 NOTE — PERIOP NOTE
TRANSFER - OUT REPORT:    Verbal report given to Phoenix, RN on Shady Matt  being transferred to [facility] for routine post-op       Report consisted of patient's Situation, Background, Assessment and   Recommendations(SBAR).     Information from the following report(s) Nurse Handoff Report and Cardiac Rhythm SR with 1st degree  was reviewed with the receiving nurse.           Lines:   Peripheral IV 05/17/24 Distal;Left;Anterior Forearm (Active)        Opportunity for questions and clarification was provided.      Patient transported with:  O2 @ 2lpm

## 2024-05-17 NOTE — PROGRESS NOTES
TRANSFER - IN REPORT:    Verbal report received from Muriel RN on Shady Matt  being received from PACU for routine post-op      Report consisted of patient's Situation, Background, Assessment and   Recommendations(SBAR).     Information from the following report(s) Nurse Handoff Report and MAR was reviewed with the receiving nurse.    Opportunity for questions and clarification was provided.      Assessment completed upon patient's arrival to unit and care assumed.

## 2024-05-17 NOTE — CONSULTS
[facility] Hospitalist Consult   Admit Date:  2024  7:37 AM   Name:  Shady Matt   Age:  88 y.o.  Sex:  male  :  1935   MRN:  [id number]   Room:  [facility]/01    Presenting/Chief Complaint: No chief complaint on file.    Reason(s) for Admission: Primary osteoarthritis of right hip [M16.11]  Osteoarthritis of right hip, unspecified osteoarthritis type [M16.11]     Hospitalists consulted by Froy Viramontes MD for: medical management    History of Presenting Illness:   Shady Matt is a 88 y.o. male with history of HTN, CKD3b,  CAD s/p CABG, s/p TAVR, a fib s/p ablation who presented for right total hip arthroplasty with Dr. Viramontes. Tolerated procedure well. Hospitalist consulted for medical management.       Assessment & Plan:     Principal Problem:    Primary osteoarthritis of right hip    Osteoarthritis of right hip, unspecified osteoarthritis type  S/p right total hip arthroplasty  POD 0  Defer to primary regarding Analgesia, Bowel regimen .PT/OT,DVT prophylaxis and discharge planning      CKD 3 b  Baseline creatinine : 1.2-1.6  Currently at baseline  Avoid nephrotoxic agents  Renally dose medications    CAD s/p CABG  Atrial fib/SVT s/p ablation  Hypercoagulable state secondary to Atrial fibrillation  Hypertension  Hold metoprolol until tomorrow due to borderline bp and scheduled pain meds     BRIDGET  Cpap qhs    PUD  Continue ppi    Anticipated Discharge Arrangements:   Defer to primary team    PT/OT evals and PPD needed/ordered?  Defer to primary team  Diet:  ADULT DIET; Regular  VTE prophylaxis: Defer to primary team  Code status: Full Code    Non-peripheral Lines and Tubes (if present):          Telemetry (if present):  Cardiac/Telemetry Monitor On: Bedside monitor in use        Hospital Problems:  Principal Problem:    Primary osteoarthritis of right hip  Active Problems:    Osteoarthritis of right hip, unspecified osteoarthritis type  Resolved Problems:    * No resolved hospital problems.  diphenhydrAMINE (BENADRYL) injection 25 mg  25 mg IntraVENous Q6H PRN    aspirin EC tablet 81 mg  81 mg Oral BID    [START ON 5/18/2024] dexAMETHasone (DECADRON) injection 10 mg  10 mg IntraVENous Once    naloxone (NARCAN) injection 0.4 mg  0.4 mg IntraVENous PRN    ondansetron (ZOFRAN-ODT) disintegrating tablet 8 mg  8 mg Oral Q8H PRN    [START ON 5/18/2024] pantoprazole (PROTONIX) tablet 40 mg  40 mg Oral QAM AC    tiZANidine (ZANAFLEX) tablet 2 mg  2 mg Oral TID    alcohol 62% (NOZIN) nasal  3 ampule  3 ampule Nasal Once    [START ON 5/18/2024] metoprolol (LOPRESSOR) tablet 100 mg  100 mg Oral BID       Signed:  Vanessa Costa MD    Part of this note may have been written by using a voice dictation software.  The note has been proof read but may still contain some grammatical/other typographical errors.

## 2024-05-17 NOTE — ANESTHESIA PRE PROCEDURE
results found for: \"LABABO\"    Drug/Infectious Status (If Applicable):  No results found for: \"HIV\", \"HEPCAB\"    COVID-19 Screening (If Applicable): No results found for: \"COVID19\"        Anesthesia Evaluation  Patient summary reviewed and Nursing notes reviewed  Airway: Mallampati: II  TM distance: <3 FB   Neck ROM: full  Mouth opening: > = 3 FB   Dental:    (+) lower dentures and upper dentures      Pulmonary: breath sounds clear to auscultation  (+)     sleep apnea: on CPAP,                                  Cardiovascular:  Exercise tolerance: poor (<4 METS), Limited by DJD  (+) hypertension:, valvular problems/murmurs (s/p AVR):, CABG/stent:, dysrhythmias (s/p SVT ablation):, hyperlipidemia        Rhythm: regular  Rate: normal                 ROS comment: Echo 2020 - normal EF, normal AV prosthesis     Neuro/Psych:   Negative Neuro/Psych ROS              GI/Hepatic/Renal:   (+) GERD:, renal disease: CRI          Endo/Other:    (+) : arthritis:..                 Abdominal:             Vascular: negative vascular ROS.         Other Findings:       Anesthesia Plan      spinal     ASA 3           MIPS: Postoperative opioids intended and Prophylactic antiemetics administered.  Anesthetic plan and risks discussed with patient, spouse and child/children.                    SULLY BRITO MD   5/17/2024

## 2024-05-17 NOTE — OP NOTE
Total Hip Procedure Note    Shady Matt,  [id number],  1935    Pre-operative Diagnosis: Primary osteoarthritis of right hip [M16.11]    Post-operative Diagnosis: Same    Procedure: Imageless Lico-Assisted Right Total Hip Arthroplasty    BMI: Body mass index is 26.17 kg/m²..    Location: [facility]    Surgeon: Froy Viramontes MD    Assistant: Samir Wolf CFA    Anesthesia: Spinal     Complications: None    EBL: 200cc    Drains: None    Intra-op Findings: Pre-operatively leg lengths were assessed using preop Xrays and with the patient in the lateral decubitus position and operative leg was felt to be 5mm shorter in length compared to the contralateral leg. The operative hip showed notable cartilage loss of both the femoral head and acetabulum. No intra-operative periprosthetic fracture was encountered. Sciatic nerve was noted to be intact at the end of the procedure. We measured the distance of our resected femoral head/neck from the cut surface to the center of the femoral head to be approximately 35mm. The overall length replaced with the implanted head/neck was 40mm.  Intra-operatively we felt that we restored the patients leg lengths to equal lengths using the method described later. Postop with the patient supine we assessed leg lengths and felt they were similar.      Patient condition at completion of Procedure: Stable    Implants:   Implant Name Type Inv. Item Serial No.  Lot No. LRB No. Used Action   SCREW BNE L35MM [id number] LO PROF HEX TRIDENT LL - [id number]  SCREW BNE L35MM [id number] LO PROF HEX TRIDENT   [employer] [facility] [id number] Right 1 Implanted   INSERT ACET E 10 DEG 36 MM HIP X3 TRIDENT - [id number]  INSERT ACET E 10 DEG 36 MM HIP X3 TRIDENT  [employer] [facility] [id number] Right 1 Implanted   SHELL ACET SZ E [id number] CLUS H TRITANIUM PRESSFIT ELIZ - [id number]  SHELL ACET SZ E [id number] CLUS H TRITANIUM PRESSFIT ELIZ  [employer] [facility]  right hip. The incision was carried through the subcutaneous tissue and underlying fascia with hemostasis obtained using the bovie cauterization and Aquamantys.  A Charmley retractor was inserted. We then inserted our femoral checkpoint in the posterolateral aspect of the greater trochanter.  We resected any redundent bursal tissue off the external rotators. We were able to identify the piriformis tendon. The short external rotators and capsule were dissected off the posterior femur as a single layer just superior to the piriformis tendon. The sciatic nerve was palpated and protected during dissection. The femoral head was dislocated. The articular surface revealed loss of cartilage, exposed bone and bone spurring. The neck was osteotomized approximately 1cm above the top of the lesser trochanter. We were careful to protect the greater trochanter during osteotomy and protect it from iatrogenic injury.    Acetabular retractors were placed both anterior and posterior just superficial to the acetabular labrum.  Remaining acetabular labrum was resected and any soft tissue was removed from the acetabulum including any tissue within the codyloid fossa. The acetabular surface revealed loss of cartilage with exposed bone.    We then performed all required point acquisitions within and outside the acetabulum per Lico protocol. We then proceeded with to adjust our preop plan as needed to obtain desired bone purchase while avoiding over-reaming. In addition implant position was adjust as to minimize/avoid impingement. Our final acetabular implant was set at a size 54mm cup and position was set at 40 degrees inclination and 20 degrees of anteversion.   The acetabulum was reamed noting proper anteversion and inclination during reaming. The transverse acetabular ligament was used as the primary anatomic landmark to determine version and inclination. The prepared acetabulum was irrigated of any residual reamings and soft tissue.

## 2024-05-17 NOTE — PROGRESS NOTES
05/17/24 1604   Treatment   Treatment Type IS   Incentive Spirometry Tx   Treatment Effort Well   Achieved Volume (mL) 3000 mL

## 2024-05-17 NOTE — ANESTHESIA PROCEDURE NOTES
Spinal Block    Patient location during procedure: OR  End time: 5/17/2024 9:57 AM  Reason for block: primary anesthetic  Staffing  Performed: anesthesiologist   Anesthesiologist: SULLY Prieto MD  Performed by: SULLY Prieto MD  Authorized by: SULLY Prieto MD    Spinal Block  Patient position: sitting  Prep: ChloraPrep and site prepped and draped  Patient monitoring: cardiac monitor, continuous pulse ox, frequent blood pressure checks and oxygen  Approach: midline  Location: L3/L4  Provider prep: mask and sterile gloves  Local infiltration: lidocaine  Needle  Needle type: Daysi   Needle gauge: 25 G  Assessment  Swirl obtained: Yes  CSF: clear  Attempts: 1  Hemodynamics: stable  Preanesthetic Checklist  Completed: patient identified, IV checked, site marked, risks and benefits discussed, surgical/procedural consents, equipment checked, pre-op evaluation, timeout performed, anesthesia consent given, oxygen available and monitors applied/VS acknowledged

## 2024-05-17 NOTE — PROGRESS NOTES
OCCUPATIONAL THERAPY Initial Assessment, Daily Note, and PM      (Link to Caseload Tracking: OT Visit Days: 1  OT Orders   Time  OT Charge Capture  Rehab Caseload Tracker  Episode     Shady Matt is a 88 y.o. male   PRIMARY DIAGNOSIS: Primary osteoarthritis of right hip  Primary osteoarthritis of right hip [M16.11]  Osteoarthritis of right hip, unspecified osteoarthritis type [M16.11]  Procedure(s) (LRB):  RIGHT ROBOTIC HIP TOTAL ARTHROPLASTY, robotic (Right)  Day of Surgery  Reason for Referral: Pain in Right Hip (M25.551)  Stiffness of Right Hip, Not elsewhere classified (M25.651)  Outpatient in a bed: Payor: MEDICARE / Plan: MEDICARE PART A AND B / Product Type: *No Product type* /     ASSESSMENT:     REHAB RECOMMENDATIONS:   Recommendation to date pending progress:  Setting:  No further skilled occupational therapy after discharge from hospital    Equipment:    3 in 1 Bedside Commode ( )  Rolling Walker     ASSESSMENT:  Mr. Matt is s/p right ARMIDA and presents with decreased independence with functional mobility and activities of daily living as compared to baseline level of function and safety. Patient would benefit from skilled Occupational Therapy to maximize independence and safety with self-care task and functional mobility.   Patient able to complete  toileting and grooming at bathroom with contact guard assist .  Mobilized from recliner/chair to hallway and bathroom using a rolling walker with assist. Positioned for comfort in recliner chair with all needs met and in reach. Should progress well with ADL session in AM. Patient is hopeful to spend the night to better prepare for safe discharge home. Family at bedside and will offer assist at discharge.       [facility] AM-PAC™ “6 Clicks” Daily Activity Inpatient Short Form:     AM-PAC Daily Activity - Inpatient   How much help is needed for putting on and taking off regular lower body clothing?: A Lot  How much help is needed for bathing

## 2024-05-17 NOTE — PROGRESS NOTES
ACUTE PHYSICAL THERAPY GOALS:   (Developed with and agreed upon by patient and/or caregiver.)  GOALS (1-4 days):  (1.)Mr. Matt will move from supine to sit and sit to supine  in bed with SUPERVISION.    (2.)Mr. Matt will transfer from bed to chair and chair to bed with SUPERVISION using the least restrictive device.    (3.)Mr. Matt will ambulate with SUPERVISION for 300 feet with the least restrictive device.   (4.)Mr. Matt will ambulate up/down 2 steps with right railing with CONTACT GUARD ASSIST.  (5.)Mr. Matt will state/observe ARMIDA precautions with 0 verbal cues.  ________________________________________________________________________________________________     PHYSICAL THERAPY: TOTAL HIP ARTHROPLASTY Initial Assessment and PM  (Link to Caseload Tracking: PT Visit Days : 1  Acknowledge Orders  Time In/Out  PT Charge Capture  Rehab Caseload Tracker  Episode   Shady Matt is a 88 y.o. male   PRIMARY DIAGNOSIS: Primary osteoarthritis of right hip  Primary osteoarthritis of right hip [M16.11]  Osteoarthritis of right hip, unspecified osteoarthritis type [M16.11]  Procedure(s) (LRB):  RIGHT ROBOTIC HIP TOTAL ARTHROPLASTY, robotic (Right)  Day of Surgery  Reason for Referral: Pain in Right Hip (M25.551)  Stiffness of Right Hip, Not elsewhere classified (M25.651)  Difficulty in walking, Not elsewhere classified (R26.2)  Outpatient in a bed: Payor: MEDICARE / Plan: MEDICARE PART A AND B / Product Type: *No Product type* /     REHAB RECOMMENDATIONS:   Recommendation to date pending progress:  Setting:  Home Health Therapy    Equipment:    Rolling Walker     GAIT: I Mod I S SBA CGA Min Mod Max Total  NT x2 Comments:   Level of Assistance [] [] [] [] [x] [] [] [] [] [] []    Weightbearing Status  wbat     Distance  175 feet    Gait Quality Antalgic, Decreased latosha , Decreased step clearance, Decreased step length, and Decreased stance    DME Rolling Walker     Stairs      Ramp     I=Independent,

## 2024-05-18 VITALS
DIASTOLIC BLOOD PRESSURE: 70 MMHG | RESPIRATION RATE: 16 BRPM | HEIGHT: 70 IN | HEART RATE: 105 BPM | BODY MASS INDEX: 26.11 KG/M2 | OXYGEN SATURATION: 95 % | WEIGHT: 182.4 LBS | TEMPERATURE: 97.9 F | SYSTOLIC BLOOD PRESSURE: 129 MMHG

## 2024-05-18 LAB
ALBUMIN SERPL-MCNC: 3.2 G/DL (ref 3.2–4.6)
ANION GAP SERPL CALC-SCNC: 13 MMOL/L (ref 9–18)
BUN SERPL-MCNC: 15 MG/DL (ref 8–23)
CALCIUM SERPL-MCNC: 9.2 MG/DL (ref 8.8–10.2)
CHLORIDE SERPL-SCNC: 102 MMOL/L (ref 98–107)
CO2 SERPL-SCNC: 22 MMOL/L (ref 20–28)
CREAT SERPL-MCNC: 1.3 MG/DL (ref 0.8–1.3)
GLUCOSE SERPL-MCNC: 145 MG/DL (ref 70–99)
HCT VFR BLD AUTO: 31.6 % (ref 41.1–50.3)
HGB BLD-MCNC: 10.9 G/DL (ref 13.6–17.2)
PHOSPHATE SERPL-MCNC: 3.8 MG/DL (ref 2.5–4.5)
POTASSIUM SERPL-SCNC: 4.4 MMOL/L (ref 3.5–5.1)
SODIUM SERPL-SCNC: 137 MMOL/L (ref 136–145)

## 2024-05-18 PROCEDURE — 85014 HEMATOCRIT: CPT

## 2024-05-18 PROCEDURE — 85018 HEMOGLOBIN: CPT

## 2024-05-18 PROCEDURE — 6360000002 HC RX W HCPCS: Performed by: NURSE PRACTITIONER

## 2024-05-18 PROCEDURE — 2580000003 HC RX 258: Performed by: NURSE PRACTITIONER

## 2024-05-18 PROCEDURE — 97530 THERAPEUTIC ACTIVITIES: CPT

## 2024-05-18 PROCEDURE — 94760 N-INVAS EAR/PLS OXIMETRY 1: CPT

## 2024-05-18 PROCEDURE — 80069 RENAL FUNCTION PANEL: CPT

## 2024-05-18 PROCEDURE — 6370000000 HC RX 637 (ALT 250 FOR IP): Performed by: NURSE PRACTITIONER

## 2024-05-18 PROCEDURE — 97535 SELF CARE MNGMENT TRAINING: CPT

## 2024-05-18 PROCEDURE — 6370000000 HC RX 637 (ALT 250 FOR IP): Performed by: STUDENT IN AN ORGANIZED HEALTH CARE EDUCATION/TRAINING PROGRAM

## 2024-05-18 PROCEDURE — 36415 COLL VENOUS BLD VENIPUNCTURE: CPT

## 2024-05-18 RX ADMIN — CEFAZOLIN 2000 MG: 10 INJECTION, POWDER, FOR SOLUTION INTRAVENOUS at 02:12

## 2024-05-18 RX ADMIN — ACETAMINOPHEN 1000 MG: 500 TABLET, FILM COATED ORAL at 06:23

## 2024-05-18 RX ADMIN — SENNOSIDES AND DOCUSATE SODIUM 1 TABLET: 50; 8.6 TABLET ORAL at 09:04

## 2024-05-18 RX ADMIN — ASPIRIN 81 MG: 81 TABLET, COATED ORAL at 09:04

## 2024-05-18 RX ADMIN — ACETAMINOPHEN 1000 MG: 500 TABLET, FILM COATED ORAL at 02:12

## 2024-05-18 RX ADMIN — PANTOPRAZOLE SODIUM 40 MG: 40 TABLET, DELAYED RELEASE ORAL at 06:23

## 2024-05-18 RX ADMIN — METOPROLOL TARTRATE 100 MG: 100 TABLET, FILM COATED ORAL at 09:03

## 2024-05-18 ASSESSMENT — PAIN DESCRIPTION - ORIENTATION: ORIENTATION: RIGHT

## 2024-05-18 ASSESSMENT — PAIN DESCRIPTION - LOCATION: LOCATION: HIP

## 2024-05-18 ASSESSMENT — PAIN DESCRIPTION - DESCRIPTORS: DESCRIPTORS: ACHING

## 2024-05-18 ASSESSMENT — PAIN SCALES - GENERAL: PAINLEVEL_OUTOF10: 4

## 2024-05-18 NOTE — PROGRESS NOTES
ACUTE PHYSICAL THERAPY GOALS:   (Developed with and agreed upon by patient and/or caregiver.)  GOALS (1-4 days):  (1.)Mr. Matt will move from supine to sit and sit to supine  in bed with SUPERVISION.    (2.)Mr. Matt will transfer from bed to chair and chair to bed with SUPERVISION using the least restrictive device.    (3.)Mr. Matt will ambulate with SUPERVISION for 300 feet with the least restrictive device.   (4.)Mr. Matt will ambulate up/down 2 steps with right railing with CONTACT GUARD ASSIST.  (5.)Mr. Matt will state/observe ARMIDA precautions with 0 verbal cues.  ________________________________________________________________________________________________     PHYSICAL THERAPY: TOTAL HIP ARTHROPLASTY Daily Note and AM  (Link to Caseload Tracking: PT Visit Days : 2  Acknowledge Orders  Time In/Out  PT Charge Capture  Rehab Caseload Tracker  Episode   Shady Matt is a 88 y.o. male   PRIMARY DIAGNOSIS: Primary osteoarthritis of right hip  Primary osteoarthritis of right hip [M16.11]  Osteoarthritis of right hip, unspecified osteoarthritis type [M16.11]  Procedure(s) (LRB):  RIGHT ROBOTIC HIP TOTAL ARTHROPLASTY, robotic (Right)  1 Day Post-Op  Reason for Referral: Pain in Right Hip (M25.551)  Stiffness of Right Hip, Not elsewhere classified (M25.651)  Difficulty in walking, Not elsewhere classified (R26.2)  Outpatient in a bed: Payor: MEDICARE / Plan: MEDICARE PART A AND B / Product Type: *No Product type* /     REHAB RECOMMENDATIONS:   Recommendation to date pending progress:  Setting:  Home Health Therapy    Equipment:    Rolling Walker     GAIT: I Mod I S SBA CGA Min Mod Max Total  NT x2 Comments:   Level of Assistance [] [] [] [x] [] [] [] [] [] [] []    Weightbearing Status  Right Lower Extremity Weight Bearing: Weight Bearing As Tolerated    Distance  175 feet    Gait Quality Antalgic, Decreased latosha , Decreased step clearance, Decreased step length, and Decreased stance    DME  bedside    INTERDISCIPLINARY COLLABORATION:  RN/ PCT    Compliance with Program/Exercises: compliant all of the time, Will assess as treatment progresses.    Recommendations/Intent for next treatment session:  Treatment next visit will focus on increasing Mr. Matt's independence with bed mobility, transfers, gait training, strength/ROM exercises, modalities for pain, and patient education.     TIME IN/OUT:  Time In: 1100  Time Out: 1140  Minutes: 40    Ana Paula Salas, PT

## 2024-05-18 NOTE — PROGRESS NOTES
Hospitalist Progress Note   Admit Date:  2024  7:37 AM   Name:  Shady Matt   Age:  88 y.o.  Sex:  male  :  1935   MRN:  [id number]   Room:  [facility]/01    Presenting/Chief Complaint: No chief complaint on file.     Reason(s) for Admission: Primary osteoarthritis of right hip [M16.11]  Osteoarthritis of right hip, unspecified osteoarthritis type [M16.11]     Hospital Course:   Shady Matt is a 88 y.o. male with medical history history of HTN, CKD3b,  CAD s/p CABG, s/p TAVR, a fib s/p ablation who presented for right total hip arthroplasty with Dr. Viramontes. Tolerated procedure well. Hospitalist consulted for medical management.       Subjective & 24hr Events:   States he is still in some pain. Urinating well. Feels he could have a BM.     Medically stable for discharge. Defer discharge planning to orthopedic surgery. Recommend pcp/ortho follow cbc in the future.       Assessment & Plan:     Primary osteoarthritis of right hip    Osteoarthritis of right hip, unspecified osteoarthritis type  S/p right total hip arthroplasty  POD 1  Defer to primary regarding Analgesia, Bowel regimen .PT/OT,DVT prophylaxis and discharge planning        CKD 3 b  Baseline creatinine : 1.2-1.6  Currently at baseline  Avoid nephrotoxic agents  Renally dose medications     CAD s/p CABG  Atrial fib/SVT s/p ablation  Hypercoagulable state secondary to Atrial fibrillation  Hypertension  Resume metoprolol      BRIDGET  Cpap qhs     PUD  Continue ppi     Anticipated Discharge Arrangements:   Defer to primary team    PT/OT evals and PPD needed/ordered?  Yes  Diet:  ADULT DIET; Regular  VTE prophylaxis: Defer to primary team  Code status: Full Code      Non-peripheral Lines and Tubes (if present):          Telemetry (if present):  Cardiac/Telemetry Monitor On: Bedside monitor in use        Hospital Problems:  Principal Problem:    Primary osteoarthritis of right hip  Active Problems:    Osteoarthritis of right hip,

## 2024-05-18 NOTE — PROGRESS NOTES
Assistance, Min=Minimal Assistance, Mod=Moderate Assistance, Max=Maximal Assistance, Total=Total Assistance, NT=Not Tested    PLAN:     FREQUENCY/DURATION     for duration of hospital stay or until stated goals are met, whichever comes first.    ACUTE OCCUPATIONAL THERAPY GOALS:   (Developed with and agreed upon by patient and/or caregiver.)    GOALS:   DISCHARGE GOALS (in preparation for going home/rehab):  3 days  1.  Mr. Matt will perform lower body dressing activity with minimal assist required to demonstrate improved functional mobility and safety.   -GOAL MET 5/18/24     2.  Mr. Matt will perform bathing activity with minimal assist required to demonstrate improved functional mobility and safety.-GOAL MET 5/18/24     3.  Mr. Matt will perform toileting activity with  contact guard assist to demonstrate improved functional mobility and safety.-GOAL MET 5/18/24   d  4.  Mr. Matt will perform all functional transfers transfer with contact guard assist to demonstrate improved functional mobility and safety.-GOAL MET 5/18/24       PROBLEM LIST:   (Skilled intervention is medically necessary to address:)  Decreased ADL/Functional Activities  Decreased Activity Tolerance  Decreased Balance  Decreased Gait Ability  Decreased Safety Awareness  Decreased Strength  Decreased Transfer Abilities  Increased Pain   INTERVENTIONS PLANNED:   (Benefits and precautions of occupational therapy have been discussed with the patient.)  Self Care Training  Therapeutic Activity  Therapeutic Exercise/HEP  Neuromuscular Re-education  Manual Therapy  Education         TREATMENT:          TREATMENT:    SELF CARE: (50 minutes):   Procedure(s) (per grid) utilized to improve and/or restore self-care/home management as related to dressing, bathing, toileting, grooming, self feeding, and functional mobility . Required minimal visual, verbal, manual, and tactile cueing to facilitate activities of daily living skills, compensatory

## 2024-05-18 NOTE — CARE COORDINATION
CM Update note:    CM notified by the OT therapist to add OT in the Home Health referral. OT has been added to the order. CM has notified [facility] intake of additional OT service to HH order. Pt is discharging today. No further CM needs identified. CM will remain accessible for consult incase additional CM needs arise prior d/c.

## 2024-05-18 NOTE — PROGRESS NOTES
05/17/24 2146   Oxygen Therapy/Pulse Ox   O2 Therapy Oxygen humidified   $Oxygen $Daily Charge   O2 Device Nasal cannula   O2 Flow Rate (L/min) 4 L/min   Pulse 89   SpO2 96 %   Skin Assessment Clean, dry, & intact   Pulse Oximeter Device Mode Continuous   Pulse Oximeter Device Location Left;Finger   $Pulse Oximeter $Spot check (multiple/continuous)     Pt on continuous monitor for HS. Alarm limits set. Pt working on IS. Pt missing parts to home CPAP. Placed on 4L NC for tonight.

## 2024-05-18 NOTE — PROGRESS NOTES
May 18, 2024         Post Op day: 1 Day Post-Op     Admit Date: 2024  Admit Diagnosis: Primary osteoarthritis of right hip [M16.11]  Osteoarthritis of right hip, unspecified osteoarthritis type [M16.11]        Subjective: Patient is status-post Procedure(s) (LRB):  RIGHT ROBOTIC HIP TOTAL ARTHROPLASTY, robotic (Right). Patient doing well. No concerns/complaints. No shortness of breath, chest pain or nausea/vomiting.      Objective:   Vitals:    246   BP:    Pulse: 89   Resp:    Temp:    SpO2: 96%    Temp (24hrs), Av °F (36.7 °C), Min:97.5 °F (36.4 °C), Max:98.4 °F (36.9 °C)    Lab Results   Component Value Date/Time    HGB 10.9 2024 04:09 AM     Extremity Exam  Dressing Clean, dry, intact.  Neuro intact and unchanged right lower extremity  Sensation intact to light touch  Extremity perfused  No sign of DVT     Assessment / Plan :  S/p Procedure(s) (LRB):  RIGHT ROBOTIC HIP TOTAL ARTHROPLASTY, robotic (Right)  Continue current postoperative plan  PT/OT-Continue current weightbearing status and restrictions of involved extremities  Continue current VTE prophylaxis  DIspo-home  Patient Active Problem List   Diagnosis    Erectile dysfunction    CAD (coronary artery disease)    BRIDGET (obstructive sleep apnea)    Dyslipidemia    Primary localized osteoarthrosis    Mixed incontinence    Malignant neoplasm of prostate (HCC)    Aortic valve regurgitation    GERD (gastroesophageal reflux disease)    Bilateral carotid artery disease (HCC)    Paroxysmal atrial fibrillation (HCC)    CKD (chronic kidney disease) stage 3, GFR 30-59 ml/min ([facility])    Difficulty using continuous positive airway pressure (CPAP) full face mask    Benign essential hypertension    Skin lesion    Encounter for long-term (current) use of medications    Acute bilateral low back pain with right-sided sciatica    Lumbar radiculopathy    Lumbar stenosis with neurogenic claudication    Primary osteoarthritis of right hip

## 2024-05-18 NOTE — DISCHARGE INSTRUCTIONS
[facility]     IF YOU HAVE ANY PROBLEMS ONCE YOU ARE AT HOME CALL THE FOLLOWING NUMBERS:   Nurse's line: [phone]  Main office number: [phone]      Medications    The medications you are to continue on are listed on the medication reconciliation sheet.   Narcotic pain medications as well as supplemental iron can cause constipation. If this occurs, try over the counter stool softeners or try stopping the narcotic pain medication and/or the iron.   It is important that you take the medication exactly as they are prescribed.  Medications which increase your risk of blood clots are listed to stop for 5 weeks after surgery as well as medications or supplements which increase your risk of bleeding complications.   Keep your medication in the bottles provided by the pharmacist and keep a list of the medication names, dosages, and times to be taken in your wallet.   Do not take other medications without consulting your doctor.  If you need a refill on your pain medication, please note our office is closed over the weekend. It is our office policy that on-call providers cannot refill narcotic pain medications over the weekend. If you will need a refill over the weekend, please call our office before 12pm on Friday or first thing Monday morning.        Important Information    Do NOT smoke as this will greatly increase your risk of infection!    Resume your prehospital diet. If you have excessive nausea or vomitting call your doctor's office     Leg swelling and warmth is normal for 6 months after surgery. If you experience swelling in your leg, elevate your leg while laying down with your toes above your heart. If you have sudden onset severe swelling with leg pain call our office. Use Dank Hose stockings until we see you in the office for your follow up appointment.    The stitches deep inside take approximately 6 months to dissolve. There will be sharp shooting, stinging and burning pain.

## 2024-05-20 ENCOUNTER — HOME CARE VISIT (OUTPATIENT)
Dept: SCHEDULING | Facility: HOME HEALTH | Age: 89
End: 2024-05-20

## 2024-05-20 PROCEDURE — G0151 HHCP-SERV OF PT,EA 15 MIN: HCPCS

## 2024-05-20 PROCEDURE — 0221000100 HH NO PAY CLAIM PROCEDURE

## 2024-05-21 VITALS
TEMPERATURE: 98.4 F | DIASTOLIC BLOOD PRESSURE: 64 MMHG | RESPIRATION RATE: 18 BRPM | SYSTOLIC BLOOD PRESSURE: 121 MMHG | OXYGEN SATURATION: 96 % | HEART RATE: 78 BPM

## 2024-05-21 ASSESSMENT — ENCOUNTER SYMPTOMS: DYSPNEA ACTIVITY LEVEL: AFTER AMBULATING MORE THAN 20 FT

## 2024-05-22 ENCOUNTER — HOME CARE VISIT (OUTPATIENT)
Dept: SCHEDULING | Facility: HOME HEALTH | Age: 89
End: 2024-05-22

## 2024-05-22 VITALS
OXYGEN SATURATION: 97 % | TEMPERATURE: 98.4 F | HEART RATE: 72 BPM | SYSTOLIC BLOOD PRESSURE: 105 MMHG | DIASTOLIC BLOOD PRESSURE: 70 MMHG | RESPIRATION RATE: 16 BRPM

## 2024-05-22 PROCEDURE — G0151 HHCP-SERV OF PT,EA 15 MIN: HCPCS

## 2024-05-23 ENCOUNTER — HOME CARE VISIT (OUTPATIENT)
Dept: SCHEDULING | Facility: HOME HEALTH | Age: 89
End: 2024-05-23

## 2024-05-23 PROCEDURE — G0152 HHCP-SERV OF OT,EA 15 MIN: HCPCS

## 2024-05-24 ENCOUNTER — HOME CARE VISIT (OUTPATIENT)
Dept: SCHEDULING | Facility: HOME HEALTH | Age: 89
End: 2024-05-24
Payer: MEDICARE

## 2024-05-24 VITALS
DIASTOLIC BLOOD PRESSURE: 62 MMHG | OXYGEN SATURATION: 97 % | TEMPERATURE: 98.8 F | HEART RATE: 87 BPM | RESPIRATION RATE: 16 BRPM | SYSTOLIC BLOOD PRESSURE: 124 MMHG

## 2024-05-24 PROCEDURE — G0157 HHC PT ASSISTANT EA 15: HCPCS

## 2024-05-24 ASSESSMENT — ENCOUNTER SYMPTOMS
DOUBLE VISION: 0
PAIN LOCATION - PAIN QUALITY: ACHEY/SORE

## 2024-05-26 VITALS
SYSTOLIC BLOOD PRESSURE: 106 MMHG | RESPIRATION RATE: 16 BRPM | DIASTOLIC BLOOD PRESSURE: 60 MMHG | TEMPERATURE: 98.7 F | OXYGEN SATURATION: 96 % | HEART RATE: 72 BPM

## 2024-05-27 ENCOUNTER — HOME CARE VISIT (OUTPATIENT)
Dept: SCHEDULING | Facility: HOME HEALTH | Age: 89
End: 2024-05-27
Payer: MEDICARE

## 2024-05-27 VITALS
SYSTOLIC BLOOD PRESSURE: 128 MMHG | TEMPERATURE: 98.4 F | HEART RATE: 68 BPM | DIASTOLIC BLOOD PRESSURE: 72 MMHG | OXYGEN SATURATION: 96 % | RESPIRATION RATE: 17 BRPM

## 2024-05-27 PROCEDURE — G0157 HHC PT ASSISTANT EA 15: HCPCS

## 2024-05-29 ASSESSMENT — ENCOUNTER SYMPTOMS: PAIN LOCATION - PAIN QUALITY: ACHING

## 2024-05-30 ENCOUNTER — OFFICE VISIT (OUTPATIENT)
Age: 89
End: 2024-05-30
Payer: MEDICARE

## 2024-05-30 VITALS
DIASTOLIC BLOOD PRESSURE: 72 MMHG | HEART RATE: 68 BPM | HEIGHT: 70 IN | WEIGHT: 187 LBS | SYSTOLIC BLOOD PRESSURE: 132 MMHG | BODY MASS INDEX: 26.77 KG/M2

## 2024-05-30 DIAGNOSIS — I10 BENIGN ESSENTIAL HYPERTENSION: ICD-10-CM

## 2024-05-30 DIAGNOSIS — I65.23 BILATERAL CAROTID ARTERY STENOSIS: ICD-10-CM

## 2024-05-30 DIAGNOSIS — I35.1 NONRHEUMATIC AORTIC VALVE INSUFFICIENCY: ICD-10-CM

## 2024-05-30 DIAGNOSIS — I25.10 CORONARY ARTERY DISEASE INVOLVING NATIVE CORONARY ARTERY OF NATIVE HEART WITHOUT ANGINA PECTORIS: ICD-10-CM

## 2024-05-30 DIAGNOSIS — I48.0 PAROXYSMAL ATRIAL FIBRILLATION (HCC): Primary | ICD-10-CM

## 2024-05-30 PROCEDURE — G8417 CALC BMI ABV UP PARAM F/U: HCPCS | Performed by: INTERNAL MEDICINE

## 2024-05-30 PROCEDURE — 1123F ACP DISCUSS/DSCN MKR DOCD: CPT | Performed by: INTERNAL MEDICINE

## 2024-05-30 PROCEDURE — 99214 OFFICE O/P EST MOD 30 MIN: CPT | Performed by: INTERNAL MEDICINE

## 2024-05-30 PROCEDURE — 1036F TOBACCO NON-USER: CPT | Performed by: INTERNAL MEDICINE

## 2024-05-30 PROCEDURE — G8427 DOCREV CUR MEDS BY ELIG CLIN: HCPCS | Performed by: INTERNAL MEDICINE

## 2024-05-30 NOTE — PROGRESS NOTES
[address] [facility], [place] [address]  PHONE: [phone]     24    NAME:  Shady Matt  : 1935  MRN: [id number]       SUBJECTIVE:   Shady Matt is a 88 y.o. male seen for a follow up visit regarding the following:     Chief Complaint   Patient presents with    Atrial Fibrillation       HPI: Here for CAD (Bio AVR, LIMA-LAD, RVOT patch due to wound during surgery, 11 hr surgery in ), SVT ablation , pAF (HAs on tikosyn).   Echo 2020: normal EF, normal AVR.      Doing PT after hip replacement, feeling well, doing better now.  Dizziness better now.  Home SBP 120s.    No CP, angina, GAONA.   Patient denies recent history of orthopnea, PND, excessive dizziness and/or syncope.   Wearing CPAP every night now.             Past Medical History, Past Surgical History, Family history, Social History, and Medications were all reviewed with the patient today and updated as necessary.     Current Outpatient Medications   Medication Sig Dispense Refill    acetaminophen (TYLENOL) 500 MG tablet Take 2 tablets by mouth every 6 hours as needed for Pain (Patient taking differently: Take 2 tablets by mouth every 6 hours as needed for Pain (Breakthrough pain)) 180 tablet 2    aspirin (ASPIRIN 81) 81 MG EC tablet Take 1 tablet by mouth in the morning and at bedtime Take one tablet morning and evening (Patient taking differently: Take 1 tablet by mouth 2 times daily at 0800 and 1400 Take one tablet morning and evening) 60 tablet 0    meloxicam (MOBIC) 15 MG tablet Take 1 tablet by mouth daily 30 tablet 2    omeprazole (PRILOSEC) 40 MG delayed release capsule Take 1 capsule by mouth daily (Patient taking differently: Take 20 mg by mouth daily) 30 capsule 0    metoprolol (LOPRESSOR) 100 MG tablet Take 1 tablet by mouth 2 times daily 180 tablet 3    rosuvastatin (CRESTOR) 5 MG tablet TAKE TABLET BY MOUTH TWO TIMES A WEEK (Patient taking differently: TAKE TABLET BY MOUTH TWO TIMES A WEEK:

## 2024-05-31 ENCOUNTER — HOME CARE VISIT (OUTPATIENT)
Dept: SCHEDULING | Facility: HOME HEALTH | Age: 89
End: 2024-05-31
Payer: MEDICARE

## 2024-05-31 PROCEDURE — G0157 HHC PT ASSISTANT EA 15: HCPCS

## 2024-06-01 VITALS
TEMPERATURE: 98.5 F | DIASTOLIC BLOOD PRESSURE: 74 MMHG | OXYGEN SATURATION: 98 % | RESPIRATION RATE: 16 BRPM | SYSTOLIC BLOOD PRESSURE: 130 MMHG | HEART RATE: 62 BPM

## 2024-06-03 ENCOUNTER — HOME CARE VISIT (OUTPATIENT)
Dept: SCHEDULING | Facility: HOME HEALTH | Age: 89
End: 2024-06-03
Payer: MEDICARE

## 2024-06-03 VITALS
RESPIRATION RATE: 16 BRPM | DIASTOLIC BLOOD PRESSURE: 70 MMHG | TEMPERATURE: 98.2 F | HEART RATE: 62 BPM | OXYGEN SATURATION: 96 % | SYSTOLIC BLOOD PRESSURE: 132 MMHG

## 2024-06-03 PROCEDURE — G0157 HHC PT ASSISTANT EA 15: HCPCS

## 2024-06-06 ENCOUNTER — HOME CARE VISIT (OUTPATIENT)
Dept: SCHEDULING | Facility: HOME HEALTH | Age: 89
End: 2024-06-06
Payer: MEDICARE

## 2024-06-06 VITALS
OXYGEN SATURATION: 95 % | RESPIRATION RATE: 16 BRPM | SYSTOLIC BLOOD PRESSURE: 120 MMHG | DIASTOLIC BLOOD PRESSURE: 82 MMHG | TEMPERATURE: 98.2 F | HEART RATE: 71 BPM

## 2024-06-06 PROCEDURE — G0157 HHC PT ASSISTANT EA 15: HCPCS

## 2024-06-07 ENCOUNTER — OFFICE VISIT (OUTPATIENT)
Dept: ORTHOPEDIC SURGERY | Age: 89
End: 2024-06-07

## 2024-06-07 DIAGNOSIS — Z96.641 STATUS POST RIGHT HIP REPLACEMENT: ICD-10-CM

## 2024-06-07 DIAGNOSIS — M25.551 RIGHT HIP PAIN: Primary | ICD-10-CM

## 2024-06-07 PROCEDURE — 99024 POSTOP FOLLOW-UP VISIT: CPT | Performed by: NURSE PRACTITIONER

## 2024-06-07 NOTE — PROGRESS NOTES
Patient ID:  Shady Matt  [id number]  88 y.o.  1935    Today: June 7, 2024       CHIEF COMPLAINT:  Follow-up of right total hip replacement    HISTORY:  The patient presents today for 3-week follow-up after total hip replacement.  The patient continues to improve gradually.  Working with physical therapy on strengthening and stretching.  They are on aspirin for DVT prophylaxis.  Using assistive walking device appropriately.  Continues to take medication appropriately.      PE:  Incision is examined which is healing well.  No erythema, induration or drainage.  No significant fluid accumulation around the surgical site distally.   Distally able to grossly plantar and dorsiflex foot and ankle.  Sensation intact.  Limb is perfused.  No sign of DVT.    X-RAYS:    XR Pelvis 2/3 Views  Views Obtained: AP Pelvis and Frog leg lateral of right hip  Indication: Postop Right Total Hip Replacement  Findings:   X-rays are viewed which show total hip replacement in place on the right side.  All hardware appears to be stable compared to immediate postoperative films.  The acetabular component appears to be in good position, no evidence of loosening.  Anteversion and inclination angle appear to be within acceptable criteria.  The stem appears to be appropriately sized without any evidence of subsidence.  No evidence of proximal femoral periprosthetic fracture.  Hip is reduced.   Impression: Normal Xray after total hip replacement    BRYNN LARSON, APRN - CNP    ASSESSMENT:  3 Weeks S/P Right Total Hip Replacement    PLAN:  Continue activity and current weight bearing status.  Continue posterior hip precautions if applicable.  Continue to take the aspirin for another week for a full month of DVT prophylaxis.  I have asked the patient not to submerge the incision under water or place any creams or oils over this for another week or two.  I will plan to check them back in 3 months for follow-up or sooner if they have

## 2024-06-10 ENCOUNTER — HOME CARE VISIT (OUTPATIENT)
Dept: SCHEDULING | Facility: HOME HEALTH | Age: 89
End: 2024-06-10
Payer: MEDICARE

## 2024-06-10 VITALS
DIASTOLIC BLOOD PRESSURE: 72 MMHG | RESPIRATION RATE: 18 BRPM | TEMPERATURE: 98.2 F | HEART RATE: 72 BPM | OXYGEN SATURATION: 98 % | SYSTOLIC BLOOD PRESSURE: 124 MMHG

## 2024-06-10 PROCEDURE — G0151 HHCP-SERV OF PT,EA 15 MIN: HCPCS

## 2024-06-10 ASSESSMENT — ENCOUNTER SYMPTOMS: PAIN LOCATION - PAIN QUALITY: SORE

## 2024-07-25 NOTE — PROGRESS NOTES
Subscriber ID: [id number] - (Medicare)      AMB Supply Order  Order Details     DME Location:   Order Date: 7/26/2024   The encounter diagnosis was BRIDGET (obstructive sleep apnea).             (  X   )Supplies Needed       apap Machine   (     ) CPAP Unit  (     ) Auto CPAP Unit  (     ) BiLevel Unit  (     ) Auto BiLevel Unit  (     ) ASV   (     ) Bilevel ST      Length of need: 12 months    Pressure:  6-8 cmH20  EPR:      Starting Ramp Pressure:   cm H20  Ramp Time: min      Patient had a diagnostic Apnea Hypopnea Index (AHI) of :    *SUPPLIES* Replace all as needed, or per coverage guidelines     Masks Type:  ( x   ) -Full Face Mask (1 per 3 mon)  (  x  ) -Full Mask (1 per month) Interface/Cushion      (  ) -Nasal Mask (1 per 3 mon)  (  ) - Nasal Mask (1 per month) Interface/Cushion  (     ) -Pillow (2 per mon)  (     ) -Kktbllmiq (1 per 6 mon)            Other Supplies:    (   X  )-Aebtpcvu (1 per 6 mon)  (   X  )-Daazvy Tubing (1 per 3 mon)  (   X  )- Disposable Filter (2 per mon)  (   x  )-Djywts Humidifier (1 per year)     ( x    )-Hirmiuehq (sometimes used with Full Face Mask) (1 per 6 mos)  (    )-Tubing-without heat (1 per 3 mos)  (     )-Non-Disposable Filter (1 per 6 mos)  (  x   )-Water Chamber (1 per 6 mos)  (     )-Humidifier non-heated (1 per 5 yrs)      Signed Date: 7/26/2024  Electronically Signed By: MIRELLA Jaquez CNP  Electronically Dated:  7/26/2024     No orders of the defined types were placed in this encounter.        Collaborating Physician: Dr. Sims    Today's office visit was spent  reviewing test results, prognosis, importance of compliance, education about disease process, benefits of medications, instructions for management of acute flare-ups, and follow up plans.  Total time spent with patient was 23 minutes.        Jeannie A Aly, APRN - CNP  Electronically signed

## 2024-07-26 ENCOUNTER — OFFICE VISIT (OUTPATIENT)
Dept: SLEEP MEDICINE | Age: 89
End: 2024-07-26
Payer: MEDICARE

## 2024-07-26 VITALS
WEIGHT: 194 LBS | OXYGEN SATURATION: 95 % | TEMPERATURE: 97.5 F | HEART RATE: 70 BPM | HEIGHT: 69 IN | RESPIRATION RATE: 19 BRPM | BODY MASS INDEX: 28.73 KG/M2 | SYSTOLIC BLOOD PRESSURE: 149 MMHG | DIASTOLIC BLOOD PRESSURE: 72 MMHG

## 2024-07-26 DIAGNOSIS — G47.34 NOCTURNAL HYPOXEMIA: ICD-10-CM

## 2024-07-26 DIAGNOSIS — G47.33 OSA (OBSTRUCTIVE SLEEP APNEA): Primary | ICD-10-CM

## 2024-07-26 PROCEDURE — G8427 DOCREV CUR MEDS BY ELIG CLIN: HCPCS | Performed by: NURSE PRACTITIONER

## 2024-07-26 PROCEDURE — 1036F TOBACCO NON-USER: CPT | Performed by: NURSE PRACTITIONER

## 2024-07-26 PROCEDURE — 1123F ACP DISCUSS/DSCN MKR DOCD: CPT | Performed by: NURSE PRACTITIONER

## 2024-07-26 PROCEDURE — 99213 OFFICE O/P EST LOW 20 MIN: CPT | Performed by: NURSE PRACTITIONER

## 2024-07-26 PROCEDURE — G8417 CALC BMI ABV UP PARAM F/U: HCPCS | Performed by: NURSE PRACTITIONER

## 2024-08-07 ENCOUNTER — OFFICE VISIT (OUTPATIENT)
Dept: INTERNAL MEDICINE CLINIC | Facility: CLINIC | Age: 89
End: 2024-08-07
Payer: MEDICARE

## 2024-08-07 VITALS
HEIGHT: 69 IN | TEMPERATURE: 98.2 F | BODY MASS INDEX: 28.41 KG/M2 | HEART RATE: 71 BPM | DIASTOLIC BLOOD PRESSURE: 57 MMHG | WEIGHT: 191.8 LBS | SYSTOLIC BLOOD PRESSURE: 141 MMHG

## 2024-08-07 DIAGNOSIS — E78.5 DYSLIPIDEMIA: ICD-10-CM

## 2024-08-07 DIAGNOSIS — L98.9 SKIN LESION: ICD-10-CM

## 2024-08-07 DIAGNOSIS — M54.16 LUMBAR RADICULOPATHY: ICD-10-CM

## 2024-08-07 DIAGNOSIS — I25.10 CORONARY ARTERY DISEASE INVOLVING NATIVE CORONARY ARTERY OF NATIVE HEART WITHOUT ANGINA PECTORIS: ICD-10-CM

## 2024-08-07 DIAGNOSIS — R53.82 CHRONIC FATIGUE: ICD-10-CM

## 2024-08-07 DIAGNOSIS — I10 BENIGN ESSENTIAL HYPERTENSION: ICD-10-CM

## 2024-08-07 DIAGNOSIS — N39.46 MIXED INCONTINENCE: ICD-10-CM

## 2024-08-07 DIAGNOSIS — G47.33 OSA (OBSTRUCTIVE SLEEP APNEA): ICD-10-CM

## 2024-08-07 DIAGNOSIS — K21.9 GASTROESOPHAGEAL REFLUX DISEASE WITHOUT ESOPHAGITIS: ICD-10-CM

## 2024-08-07 DIAGNOSIS — L98.9 FACIAL LESION: Primary | ICD-10-CM

## 2024-08-07 PROBLEM — M16.11 OSTEOARTHRITIS OF RIGHT HIP, UNSPECIFIED OSTEOARTHRITIS TYPE: Status: RESOLVED | Noted: 2024-05-17 | Resolved: 2024-08-07

## 2024-08-07 PROBLEM — M16.11 PRIMARY OSTEOARTHRITIS OF RIGHT HIP: Status: RESOLVED | Noted: 2024-03-25 | Resolved: 2024-08-07

## 2024-08-07 PROBLEM — Z78.9 DIFFICULTY USING CONTINUOUS POSITIVE AIRWAY PRESSURE (CPAP) FULL FACE MASK: Status: RESOLVED | Noted: 2021-04-29 | Resolved: 2024-08-07

## 2024-08-07 PROBLEM — M54.41 ACUTE BILATERAL LOW BACK PAIN WITH RIGHT-SIDED SCIATICA: Status: RESOLVED | Noted: 2023-08-21 | Resolved: 2024-08-07

## 2024-08-07 LAB
ANION GAP SERPL CALC-SCNC: 12 MMOL/L (ref 9–18)
BASOPHILS # BLD: 0 K/UL (ref 0–0.2)
BASOPHILS NFR BLD: 0 % (ref 0–2)
BUN SERPL-MCNC: 13 MG/DL (ref 8–23)
CALCIUM SERPL-MCNC: 9.6 MG/DL (ref 8.8–10.2)
CHLORIDE SERPL-SCNC: 104 MMOL/L (ref 98–107)
CO2 SERPL-SCNC: 25 MMOL/L (ref 20–28)
CREAT SERPL-MCNC: 1.35 MG/DL (ref 0.8–1.3)
DIFFERENTIAL METHOD BLD: ABNORMAL
EOSINOPHIL # BLD: 0.1 K/UL (ref 0–0.8)
EOSINOPHIL NFR BLD: 2 % (ref 0.5–7.8)
ERYTHROCYTE [DISTWIDTH] IN BLOOD BY AUTOMATED COUNT: 14.1 % (ref 11.9–14.6)
GLUCOSE SERPL-MCNC: 99 MG/DL (ref 70–99)
HCT VFR BLD AUTO: 40.1 % (ref 41.1–50.3)
HGB BLD-MCNC: 12.4 G/DL (ref 13.6–17.2)
IMM GRANULOCYTES # BLD AUTO: 0 K/UL (ref 0–0.5)
IMM GRANULOCYTES NFR BLD AUTO: 0 % (ref 0–5)
LYMPHOCYTES # BLD: 1.4 K/UL (ref 0.5–4.6)
LYMPHOCYTES NFR BLD: 25 % (ref 13–44)
MCH RBC QN AUTO: 29.6 PG (ref 26.1–32.9)
MCHC RBC AUTO-ENTMCNC: 30.9 G/DL (ref 31.4–35)
MCV RBC AUTO: 95.7 FL (ref 82–102)
MONOCYTES # BLD: 0.6 K/UL (ref 0.1–1.3)
MONOCYTES NFR BLD: 10 % (ref 4–12)
NEUTS SEG # BLD: 3.4 K/UL (ref 1.7–8.2)
NEUTS SEG NFR BLD: 63 % (ref 43–78)
NRBC # BLD: 0 K/UL (ref 0–0.2)
PLATELET # BLD AUTO: 151 K/UL (ref 150–450)
PMV BLD AUTO: 11.5 FL (ref 9.4–12.3)
POTASSIUM SERPL-SCNC: 4.2 MMOL/L (ref 3.5–5.1)
RBC # BLD AUTO: 4.19 M/UL (ref 4.23–5.6)
SODIUM SERPL-SCNC: 141 MMOL/L (ref 136–145)
WBC # BLD AUTO: 5.5 K/UL (ref 4.3–11.1)

## 2024-08-07 PROCEDURE — 99214 OFFICE O/P EST MOD 30 MIN: CPT | Performed by: INTERNAL MEDICINE

## 2024-08-07 PROCEDURE — G8427 DOCREV CUR MEDS BY ELIG CLIN: HCPCS | Performed by: INTERNAL MEDICINE

## 2024-08-07 PROCEDURE — G8417 CALC BMI ABV UP PARAM F/U: HCPCS | Performed by: INTERNAL MEDICINE

## 2024-08-07 PROCEDURE — G2211 COMPLEX E/M VISIT ADD ON: HCPCS | Performed by: INTERNAL MEDICINE

## 2024-08-07 PROCEDURE — 1123F ACP DISCUSS/DSCN MKR DOCD: CPT | Performed by: INTERNAL MEDICINE

## 2024-08-07 PROCEDURE — 1036F TOBACCO NON-USER: CPT | Performed by: INTERNAL MEDICINE

## 2024-08-07 RX ORDER — ROSUVASTATIN CALCIUM 5 MG/1
5 TABLET, COATED ORAL
Qty: 24 TABLET | Refills: 3 | Status: SHIPPED | OUTPATIENT
Start: 2024-08-08

## 2024-08-07 NOTE — PROGRESS NOTES
8/7/2024 10:17 AM  Location:[facility] PHYSICIAN SERVICES  [facility] INTER[facility] MEDICINE  [place]  Patient #:  [id number]  YOB: 1935          YOUR LAST HEMOGLOBIN A1CS:   No results found for: \"HBA1C\", \"VBA7LNSW\"    YOUR LAST LIPID PROFILE:   Lab Results   Component Value Date/Time    CHOL 139 02/06/2024 10:19 AM    HDL 41 02/06/2024 10:19 AM    LDL 83 02/06/2024 10:19 AM    VLDL 15 02/06/2024 10:19 AM    VLDL 24 01/31/2022 09:57 AM         Lab Results   Component Value Date/Time    GFRAA 47 01/31/2022 09:57 AM    BUN 15 05/18/2024 04:09 AM     05/18/2024 04:09 AM    K 4.4 05/18/2024 04:09 AM     05/18/2024 04:09 AM    CO2 22 05/18/2024 04:09 AM           History of Present Illness     Chief Complaint   Patient presents with    6 Month Follow-Up     Pt presents to the office today for a 6 month follow-up      Referral - General     Want a referral to another dermatologist;     This patient continues to have back pain but is markedly improved after his hip replacement surgery.  He is developed a skin lesion on his upper lip which is not healing over the past few months.    Mr. Matt is a 88 y.o. 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) 4/8/2015    Coronary atherosclerosis of native coronary vessel

## 2024-09-05 ENCOUNTER — OFFICE VISIT (OUTPATIENT)
Dept: ORTHOPEDIC SURGERY | Age: 89
End: 2024-09-05
Payer: MEDICARE

## 2024-09-05 DIAGNOSIS — Z96.641 STATUS POST RIGHT HIP REPLACEMENT: Primary | ICD-10-CM

## 2024-09-05 PROCEDURE — 1123F ACP DISCUSS/DSCN MKR DOCD: CPT | Performed by: ORTHOPAEDIC SURGERY

## 2024-09-05 PROCEDURE — 1036F TOBACCO NON-USER: CPT | Performed by: ORTHOPAEDIC SURGERY

## 2024-09-05 PROCEDURE — G8417 CALC BMI ABV UP PARAM F/U: HCPCS | Performed by: ORTHOPAEDIC SURGERY

## 2024-09-05 PROCEDURE — G8428 CUR MEDS NOT DOCUMENT: HCPCS | Performed by: ORTHOPAEDIC SURGERY

## 2024-09-05 PROCEDURE — 99213 OFFICE O/P EST LOW 20 MIN: CPT | Performed by: ORTHOPAEDIC SURGERY

## 2024-09-05 NOTE — PROGRESS NOTES
Patient ID:  Shady Matt  [id number]  88 y.o.  1935    Today: September 5, 2024       CHIEF COMPLAINT:  Follow-up of right total hip replacement    HISTORY:  The patient presents today for 4 months follow-up after total hip replacement.  The patient continues to improve gradually.  Patient is continuing to work on strengthening and stretching.  The patient has completed the full month of oral DVT prophylaxis. The patient continues to take medication appropriately.      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    Chest pain 06/28/2016    pt denies any CP or SOB    Chronic kidney disease, stage 3 (HCC)     hx of increased creatinine, renal insuff    CKD (chronic kidney disease) stage 3, GFR 30-59 ml/min ([facility]) 4/8/2015    Coronary atherosclerosis of native coronary vessel 6/28/2016    Digital mucinous cyst of finger of right hand     Disease of lung 6/28/2016    Dyslipidemia 4/8/2015    Dyspnea 6/28/2016    Encounter for long-term (current) use of other high-risk medications 11/20/2015    Exanthem 11/20/2015    Former cigarette smoker     GERD (gastroesophageal reflux disease)     medication    H/O heart artery stent     X1    History of peptic ulcer disease 1990's    History of prostate cancer 10/2009    prostatectomy    [place] (hard of hearing)     bilateral hearing aides    HTN (hypertension) 4/8/2015    Hypercholesteremia      pt denies    Hyperlipidemia      Hypertension     controlled with medication    Migraine 11/20/2015    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

## 2024-09-15 NOTE — PROGRESS NOTES
[address] [facility], [place] [address]  PHONE: [phone]     24    NAME:  Shady Matt  : 1935  MRN: [id number]       SUBJECTIVE:   Shady Matt is a 88 y.o. male seen for a follow up visit regarding the following:     Chief Complaint   Patient presents with    Atrial Fibrillation       HPI: Here for CAD (Bio AVR, LIMA-LAD, RVOT patch due to wound during surgery, 11 hr surgery in ), SVT ablation , pAF (HAs on tikosyn).   Echo 2020: normal EF, normal AVR.      Some dizziness with head movement.  Drinking water.  Home -152, better on BID BB dose.     No CP, angina, GAONA.  Cutting grass, feeling well.    Patient denies recent history of orthopnea, PND, excessive dizziness and/or syncope.   Wearing CPAP every night now.   Worsening sciatica pain issues.                     Past Medical History, Past Surgical History, Family history, Social History, and Medications were all reviewed with the patient today and updated as necessary.     Current Outpatient Medications   Medication Sig Dispense Refill    metoprolol (LOPRESSOR) 100 MG tablet Take 1 tablet by mouth 2 times daily 180 tablet 3    meclizine (ANTIVERT) 25 MG tablet Take 1 tablet by mouth 3 times daily as needed for Dizziness 30 tablet 0    rosuvastatin (CRESTOR) 5 MG tablet TAKE TABLET BY MOUTH TWO TIMES A WEEK 24 tablet 3    aspirin 81 MG EC tablet Take 1 tablet by mouth three times a week      Cholecalciferol (VITAMIN D) 50 MCG ( UT) CAPS capsule Take by mouth in the morning and at bedtime      fluticasone (CUTIVATE) 0.05 % cream APPLY TO AFFECTED AREA(S) TOPICALLY TWO TIMES A DAY 15 g 3    Multiple Vitamins-Minerals (PRESERVISION AREDS 2+MULTI VIT PO) Take by mouth daily      Multiple Vitamin (MULTIVITAMIN PO) Take by mouth daily      acetaminophen (TYLENOL) 500 MG tablet Take 1 tablet by mouth every 6 hours as needed for Pain      omeprazole (PRILOSEC) 20 MG delayed release capsule Take 1  Alert-The patient is alert, awake and responds to voice. The patient is oriented to time, place, and person. The triage nurse is able to obtain subjective information.

## 2024-09-19 ENCOUNTER — TELEPHONE (OUTPATIENT)
Dept: INTERNAL MEDICINE CLINIC | Facility: CLINIC | Age: 89
End: 2024-09-19

## 2024-09-19 RX ORDER — FLUTICASONE PROPIONATE 0.05 %
CREAM (GRAM) TOPICAL
Qty: 15 G | Refills: 5 | Status: SHIPPED | OUTPATIENT
Start: 2024-09-19 | End: 2024-09-19 | Stop reason: SDUPTHER

## 2024-09-19 RX ORDER — FLUTICASONE PROPIONATE 0.05 %
CREAM (GRAM) TOPICAL
Qty: 15 G | Refills: 5 | Status: SHIPPED | OUTPATIENT
Start: 2024-09-19

## 2024-11-12 DIAGNOSIS — I25.10 CORONARY ARTERY DISEASE INVOLVING NATIVE CORONARY ARTERY OF NATIVE HEART WITHOUT ANGINA PECTORIS: ICD-10-CM

## 2024-11-12 RX ORDER — ROSUVASTATIN CALCIUM 5 MG/1
5 TABLET, COATED ORAL
Qty: 24 TABLET | Refills: 3 | Status: SHIPPED | OUTPATIENT
Start: 2024-11-14

## 2024-11-20 ENCOUNTER — OFFICE VISIT (OUTPATIENT)
Age: 89
End: 2024-11-20

## 2024-11-20 VITALS
BODY MASS INDEX: 27.35 KG/M2 | SYSTOLIC BLOOD PRESSURE: 130 MMHG | HEIGHT: 70 IN | DIASTOLIC BLOOD PRESSURE: 72 MMHG | WEIGHT: 191 LBS | HEART RATE: 66 BPM

## 2024-11-20 DIAGNOSIS — I65.23 BILATERAL CAROTID ARTERY STENOSIS: ICD-10-CM

## 2024-11-20 DIAGNOSIS — I10 BENIGN ESSENTIAL HYPERTENSION: ICD-10-CM

## 2024-11-20 DIAGNOSIS — I35.1 NONRHEUMATIC AORTIC VALVE INSUFFICIENCY: ICD-10-CM

## 2024-11-20 DIAGNOSIS — N18.31 STAGE 3A CHRONIC KIDNEY DISEASE (HCC): ICD-10-CM

## 2024-11-20 DIAGNOSIS — I48.0 PAROXYSMAL ATRIAL FIBRILLATION (HCC): Primary | ICD-10-CM

## 2024-11-20 DIAGNOSIS — I25.10 CORONARY ARTERY DISEASE INVOLVING NATIVE CORONARY ARTERY OF NATIVE HEART WITHOUT ANGINA PECTORIS: ICD-10-CM

## 2024-11-20 RX ORDER — METOPROLOL TARTRATE 100 MG/1
50 TABLET ORAL DAILY
Qty: 90 TABLET | Refills: 3 | Status: SHIPPED | OUTPATIENT
Start: 2024-11-20

## 2024-11-20 NOTE — PROGRESS NOTES
for: \"TSHFT4\", \"TSH\"    Lab Results   Component Value Date    LABA1C 5.3 04/23/2024     Lab Results   Component Value Date     04/23/2024       Lab Results   Component Value Date    CHOL 139 02/06/2024    CHOL 144 02/02/2023    CHOL 160 01/31/2022     Lab Results   Component Value Date    TRIG 75 02/06/2024    TRIG 153 (H) 02/02/2023    TRIG 131 01/31/2022     Lab Results   Component Value Date    HDL 41 02/06/2024    HDL 28 (L) 02/02/2023    HDL 32 (L) 01/31/2022     No components found for: \"LDLCHOLESTEROL\", \"LDLCALC\"  Lab Results   Component Value Date    VLDL 15 02/06/2024    VLDL 30.6 (H) 02/02/2023    VLDL 24 01/31/2022     Lab Results   Component Value Date    CHOLHDLRATIO 3.4 02/06/2024    CHOLHDLRATIO 5.1 02/02/2023     Lab Results   Component Value Date    LDL 83 02/06/2024         I have Independently reviewed prior care notes, any ER records available, cardiac testing, labs and results with the patient and before seeing the patient today.  Also independently reviewed outside records when available.       ASSESSMENT:    Shady was seen today for atrial fibrillation.    Diagnoses and all orders for this visit:    Paroxysmal atrial fibrillation (HCC)  -     EKG 12 lead    Coronary artery disease involving native coronary artery of native heart without angina pectoris    Bilateral carotid artery stenosis    Benign essential hypertension    Nonrheumatic aortic valve insufficiency    Stage 3a chronic kidney disease (HCC)    Other orders  -     metoprolol (LOPRESSOR) 100 MG tablet; Take 0.5 tablets by mouth daily          PLAN:     1. CAD:  Remain on ASA and statin.  on BB bid.   50 daily.   Follow HR and BP.       The patient has been instructed to call with any angina or equivalent as reviewed today. All questions were answered with the patient voicing complete understanding.        2. AVR:   Remain on ASA 81.    Follow with MD in eyes.       Follow echo in 1-2 yrs.       3. PAF/SVT:  pAF was

## 2025-02-11 ENCOUNTER — OFFICE VISIT (OUTPATIENT)
Dept: INTERNAL MEDICINE CLINIC | Facility: CLINIC | Age: 89
End: 2025-02-11

## 2025-02-11 VITALS
HEIGHT: 70 IN | WEIGHT: 190 LBS | BODY MASS INDEX: 27.2 KG/M2 | TEMPERATURE: 97.6 F | SYSTOLIC BLOOD PRESSURE: 133 MMHG | RESPIRATION RATE: 16 BRPM | HEART RATE: 73 BPM | DIASTOLIC BLOOD PRESSURE: 60 MMHG

## 2025-02-11 DIAGNOSIS — M54.16 LUMBAR RADICULOPATHY: ICD-10-CM

## 2025-02-11 DIAGNOSIS — G47.33 OSA (OBSTRUCTIVE SLEEP APNEA): ICD-10-CM

## 2025-02-11 DIAGNOSIS — I48.0 PAROXYSMAL ATRIAL FIBRILLATION (HCC): ICD-10-CM

## 2025-02-11 DIAGNOSIS — R53.82 CHRONIC FATIGUE: ICD-10-CM

## 2025-02-11 DIAGNOSIS — M19.91 PRIMARY LOCALIZED OSTEOARTHROSIS: ICD-10-CM

## 2025-02-11 DIAGNOSIS — E78.5 DYSLIPIDEMIA: ICD-10-CM

## 2025-02-11 DIAGNOSIS — N18.31 STAGE 3A CHRONIC KIDNEY DISEASE (HCC): ICD-10-CM

## 2025-02-11 DIAGNOSIS — R73.9 HYPERGLYCEMIA: ICD-10-CM

## 2025-02-11 DIAGNOSIS — C61 MALIGNANT NEOPLASM OF PROSTATE (HCC): ICD-10-CM

## 2025-02-11 DIAGNOSIS — I10 BENIGN ESSENTIAL HYPERTENSION: ICD-10-CM

## 2025-02-11 DIAGNOSIS — Z00.00 MEDICARE ANNUAL WELLNESS VISIT, SUBSEQUENT: ICD-10-CM

## 2025-02-11 DIAGNOSIS — K21.9 GASTROESOPHAGEAL REFLUX DISEASE WITHOUT ESOPHAGITIS: ICD-10-CM

## 2025-02-11 DIAGNOSIS — E78.5 DYSLIPIDEMIA: Primary | ICD-10-CM

## 2025-02-11 DIAGNOSIS — B35.1 ONYCHOMYCOSIS: ICD-10-CM

## 2025-02-11 DIAGNOSIS — I25.10 CORONARY ARTERY DISEASE INVOLVING NATIVE CORONARY ARTERY OF NATIVE HEART WITHOUT ANGINA PECTORIS: ICD-10-CM

## 2025-02-11 PROBLEM — L98.9 SKIN LESION: Status: RESOLVED | Noted: 2023-07-25 | Resolved: 2025-02-11

## 2025-02-11 LAB
ALBUMIN SERPL-MCNC: 4.3 G/DL (ref 3.2–4.6)
ALBUMIN/GLOB SERPL: 1.8 (ref 1–1.9)
ALP SERPL-CCNC: 57 U/L (ref 40–129)
ALT SERPL-CCNC: 14 U/L (ref 8–55)
ANION GAP SERPL CALC-SCNC: 12 MMOL/L (ref 7–16)
AST SERPL-CCNC: 17 U/L (ref 15–37)
BASOPHILS # BLD: 0.02 K/UL (ref 0–0.2)
BASOPHILS NFR BLD: 0.3 % (ref 0–2)
BILIRUB SERPL-MCNC: 0.7 MG/DL (ref 0–1.2)
BUN SERPL-MCNC: 16 MG/DL (ref 8–23)
CALCIUM SERPL-MCNC: 9.5 MG/DL (ref 8.8–10.2)
CHLORIDE SERPL-SCNC: 104 MMOL/L (ref 98–107)
CHOLEST SERPL-MCNC: 114 MG/DL (ref 0–200)
CO2 SERPL-SCNC: 25 MMOL/L (ref 20–29)
CREAT SERPL-MCNC: 1.41 MG/DL (ref 0.8–1.3)
DIFFERENTIAL METHOD BLD: ABNORMAL
EOSINOPHIL # BLD: 0.07 K/UL (ref 0–0.8)
EOSINOPHIL NFR BLD: 1 % (ref 0.5–7.8)
ERYTHROCYTE [DISTWIDTH] IN BLOOD BY AUTOMATED COUNT: 14.3 % (ref 11.9–14.6)
GLOBULIN SER CALC-MCNC: 2.3 G/DL (ref 2.3–3.5)
GLUCOSE SERPL-MCNC: 98 MG/DL (ref 70–99)
HCT VFR BLD AUTO: 39.6 % (ref 41.1–50.3)
HDLC SERPL-MCNC: 30 MG/DL (ref 40–60)
HDLC SERPL: 3.8 (ref 0–5)
HGB BLD-MCNC: 13.1 G/DL (ref 13.6–17.2)
IMM GRANULOCYTES # BLD AUTO: 0.02 K/UL (ref 0–0.5)
IMM GRANULOCYTES NFR BLD AUTO: 0.3 % (ref 0–5)
LDLC SERPL CALC-MCNC: 62 MG/DL (ref 0–100)
LYMPHOCYTES # BLD: 1.33 K/UL (ref 0.5–4.6)
LYMPHOCYTES NFR BLD: 19.8 % (ref 13–44)
MCH RBC QN AUTO: 30.2 PG (ref 26.1–32.9)
MCHC RBC AUTO-ENTMCNC: 33.1 G/DL (ref 31.4–35)
MCV RBC AUTO: 91.2 FL (ref 82–102)
MONOCYTES # BLD: 0.57 K/UL (ref 0.1–1.3)
MONOCYTES NFR BLD: 8.5 % (ref 4–12)
NEUTS SEG # BLD: 4.72 K/UL (ref 1.7–8.2)
NEUTS SEG NFR BLD: 70.1 % (ref 43–78)
NRBC # BLD: 0 K/UL (ref 0–0.2)
PLATELET # BLD AUTO: 142 K/UL (ref 150–450)
PMV BLD AUTO: 11.6 FL (ref 9.4–12.3)
POTASSIUM SERPL-SCNC: 4.3 MMOL/L (ref 3.5–5.1)
PROT SERPL-MCNC: 6.7 G/DL (ref 6.3–8.2)
RBC # BLD AUTO: 4.34 M/UL (ref 4.23–5.6)
SODIUM SERPL-SCNC: 141 MMOL/L (ref 136–145)
TRIGL SERPL-MCNC: 113 MG/DL (ref 0–150)
VLDLC SERPL CALC-MCNC: 23 MG/DL (ref 6–23)
WBC # BLD AUTO: 6.7 K/UL (ref 4.3–11.1)

## 2025-02-11 RX ORDER — TERBINAFINE HYDROCHLORIDE 250 MG/1
250 TABLET ORAL DAILY
Qty: 84 TABLET | Refills: 0 | Status: SHIPPED | OUTPATIENT
Start: 2025-02-11 | End: 2025-05-06

## 2025-02-11 SDOH — ECONOMIC STABILITY: FOOD INSECURITY: WITHIN THE PAST 12 MONTHS, THE FOOD YOU BOUGHT JUST DIDN'T LAST AND YOU DIDN'T HAVE MONEY TO GET MORE.: NEVER TRUE

## 2025-02-11 SDOH — ECONOMIC STABILITY: FOOD INSECURITY: WITHIN THE PAST 12 MONTHS, YOU WORRIED THAT YOUR FOOD WOULD RUN OUT BEFORE YOU GOT MONEY TO BUY MORE.: NEVER TRUE

## 2025-02-11 ASSESSMENT — PATIENT HEALTH QUESTIONNAIRE - PHQ9
SUM OF ALL RESPONSES TO PHQ QUESTIONS 1-9: 0
2. FEELING DOWN, DEPRESSED OR HOPELESS: NOT AT ALL
1. LITTLE INTEREST OR PLEASURE IN DOING THINGS: NOT AT ALL
SUM OF ALL RESPONSES TO PHQ QUESTIONS 1-9: 0
1. LITTLE INTEREST OR PLEASURE IN DOING THINGS: NOT AT ALL
2. FEELING DOWN, DEPRESSED OR HOPELESS: NOT AT ALL
SUM OF ALL RESPONSES TO PHQ9 QUESTIONS 1 & 2: 0
SUM OF ALL RESPONSES TO PHQ QUESTIONS 1-9: 0
SUM OF ALL RESPONSES TO PHQ9 QUESTIONS 1 & 2: 0
SUM OF ALL RESPONSES TO PHQ QUESTIONS 1-9: 0

## 2025-02-11 NOTE — PATIENT INSTRUCTIONS
amplifiers and hearing aids that can connect to a television, stereo, radio, or microphone.  Devices that use lights or vibrations. These alert you to the doorbell, a ringing telephone, or a baby monitor.  Television closed-captioning. This shows the words at the bottom of the screen. Most new TVs can do this.  TTY (text telephone). This lets you type messages back and forth on the telephone instead of talking or listening. These devices are also called TDD. When messages are typed on the keyboard, they are sent over the phone line to a receiving TTY. The message is shown on a monitor.  Use text messaging, social media, and email if it is hard for you to communicate by telephone.  Try to learn a listening technique called speechreading. It is not lipreading. You pay attention to people's gestures, expressions, posture, and tone of voice. These clues can help you understand what a person is saying. Face the person you are talking to, and have them face you. Make sure the lighting is good. You need to see the other person's face clearly.  Think about counseling if you need help to adjust to your hearing loss.  When should you call for help?  Watch closely for changes in your health, and be sure to contact your doctor if:    You think your hearing is getting worse.     You have new symptoms, such as dizziness or nausea.   Where can you learn more?  Go to https://www.[employer].net/patientEd and enter R798 to learn more about \"Hearing Loss: Care Instructions.\"  Current as of: September 27, 2023  Content Version: 14.3  © 2024 [employer].   Care instructions adapted under license by [employer]. If you have questions about a medical condition or this instruction, always ask your healthcare professional. [employer], disclaims any warranty or liability for your use of this information.         Learning About Vision Tests  What are vision tests?     The four most common vision tests are visual acuity

## 2025-02-11 NOTE — PROGRESS NOTES
agents without results  - terbinafine (LAMISIL) 250 MG tablet; Take 1 tablet by mouth daily  Dispense: 84 tablet; Refill: 0    13. Chronic fatigue     - Comprehensive Metabolic Panel; Future  - CBC with Auto Differential; Future    14. Medicare annual wellness visit, subsequent         Return in about 6 months (around 8/11/2025).         Leo Orr, MDMedicare Annual Wellness Visit    Shady Matt is here for Medicare AWV (Subsequent) and 6 Month Check-up (Pt presents to the office today for a 6 month check-up./)    Assessment & Plan   Dyslipidemia  -     Lipid Panel; Future  Stage 3a chronic kidney disease (HCC)  Malignant neoplasm of prostate (HCC)  Hyperglycemia  Paroxysmal atrial fibrillation (HCC)  Coronary artery disease involving native coronary artery of native heart without angina pectoris  Benign essential hypertension  Gastroesophageal reflux disease without esophagitis  Primary localized osteoarthrosis  Lumbar radiculopathy  BRIDGET (obstructive sleep apnea)  Onychomycosis  -     terbinafine (LAMISIL) 250 MG tablet; Take 1 tablet by mouth daily, Disp-84 tablet, R-0Normal  Chronic fatigue  -     Comprehensive Metabolic Panel; Future  -     CBC with Auto Differential; Future  Medicare annual wellness visit, subsequent       Return in about 6 months (around 8/11/2025).     Subjective   The following acute and/or chronic problems were also addressed today:   Diagnosis Orders   1. Dyslipidemia  Lipid Panel      2. Stage 3a chronic kidney disease (HCC)        3. Malignant neoplasm of prostate (HCC)        4. Hyperglycemia        5. Paroxysmal atrial fibrillation (HCC)        6. Coronary artery disease involving native coronary artery of native heart without angina pectoris        7. Benign essential hypertension        8. Gastroesophageal reflux disease without esophagitis        9. Primary localized osteoarthrosis        10. Lumbar radiculopathy        11. BRIDGET (obstructive sleep apnea)        12.

## 2025-04-07 ENCOUNTER — TELEPHONE (OUTPATIENT)
Age: 89
End: 2025-04-07

## 2025-04-07 NOTE — TELEPHONE ENCOUNTER
Called pt  states that Dr. Babin did surgery years ago and wanted to know his name. Advised pt of doctors name was given a verbal understanding.

## 2025-04-07 NOTE — TELEPHONE ENCOUNTER
Pt stated  referred him to a Doctor for open heart surgery and pt is requesting the Doctors full name.

## 2025-06-04 ENCOUNTER — OFFICE VISIT (OUTPATIENT)
Age: 89
End: 2025-06-04
Payer: MEDICARE

## 2025-06-04 VITALS
DIASTOLIC BLOOD PRESSURE: 64 MMHG | BODY MASS INDEX: 27.4 KG/M2 | HEIGHT: 70 IN | HEART RATE: 65 BPM | SYSTOLIC BLOOD PRESSURE: 138 MMHG | WEIGHT: 191.4 LBS

## 2025-06-04 DIAGNOSIS — I35.1 NONRHEUMATIC AORTIC VALVE INSUFFICIENCY: ICD-10-CM

## 2025-06-04 DIAGNOSIS — I65.23 BILATERAL CAROTID ARTERY STENOSIS: ICD-10-CM

## 2025-06-04 DIAGNOSIS — I25.10 CORONARY ARTERY DISEASE INVOLVING NATIVE CORONARY ARTERY OF NATIVE HEART WITHOUT ANGINA PECTORIS: Primary | ICD-10-CM

## 2025-06-04 DIAGNOSIS — E78.5 DYSLIPIDEMIA: ICD-10-CM

## 2025-06-04 DIAGNOSIS — I48.0 PAROXYSMAL ATRIAL FIBRILLATION (HCC): ICD-10-CM

## 2025-06-04 DIAGNOSIS — I10 BENIGN ESSENTIAL HYPERTENSION: ICD-10-CM

## 2025-06-04 PROCEDURE — 1159F MED LIST DOCD IN RCRD: CPT | Performed by: INTERNAL MEDICINE

## 2025-06-04 PROCEDURE — 1126F AMNT PAIN NOTED NONE PRSNT: CPT | Performed by: INTERNAL MEDICINE

## 2025-06-04 PROCEDURE — G8417 CALC BMI ABV UP PARAM F/U: HCPCS | Performed by: INTERNAL MEDICINE

## 2025-06-04 PROCEDURE — G8427 DOCREV CUR MEDS BY ELIG CLIN: HCPCS | Performed by: INTERNAL MEDICINE

## 2025-06-04 PROCEDURE — 1123F ACP DISCUSS/DSCN MKR DOCD: CPT | Performed by: INTERNAL MEDICINE

## 2025-06-04 PROCEDURE — 99214 OFFICE O/P EST MOD 30 MIN: CPT | Performed by: INTERNAL MEDICINE

## 2025-06-04 PROCEDURE — 1036F TOBACCO NON-USER: CPT | Performed by: INTERNAL MEDICINE

## 2025-06-04 RX ORDER — METOPROLOL TARTRATE 100 MG/1
50 TABLET ORAL DAILY
Qty: 90 TABLET | Refills: 3 | Status: SHIPPED | OUTPATIENT
Start: 2025-06-04

## 2025-06-04 NOTE — PROGRESS NOTES
\"LDLCALC\"  Lab Results   Component Value Date    VLDL 23 02/11/2025    VLDL 15 02/06/2024    VLDL 30.6 (H) 02/02/2023     Lab Results   Component Value Date    CHOLHDLRATIO 3.8 02/11/2025    CHOLHDLRATIO 3.4 02/06/2024    CHOLHDLRATIO 5.1 02/02/2023     Lab Results   Component Value Date    LDL 62 02/11/2025         I have Independently reviewed prior care notes, any ER records available, cardiac testing, labs and results with the patient and before seeing the patient today.  Also independently reviewed outside records when available.       ASSESSMENT:    Shady was seen today for atrial fibrillation.    Diagnoses and all orders for this visit:    Coronary artery disease involving native coronary artery of native heart without angina pectoris    Paroxysmal atrial fibrillation (HCC)    Benign essential hypertension    Nonrheumatic aortic valve insufficiency    Bilateral carotid artery stenosis    Dyslipidemia    Other orders  -     metoprolol (LOPRESSOR) 100 MG tablet; Take 0.5 tablets by mouth daily          PLAN:     1. CAD:  Remain on ASA and statin.  reduce BB dose to 25mg.    Follow HR and BP.       The patient has been instructed to call with any angina or equivalent as reviewed today. All questions were answered with the patient voicing complete understanding.        2. AVR:   Remain on ASA 81.    Follow with MD in eyes.       Follow echo in 1-2 yrs.       3. PAF/SVT:  pAF was questionable dx in the past, prior SVT.  Reviewed with EP, Dr. Richard, in the past and plan on just ASA for now.       4.Carotid Dz: no more testing needed now. Follow.  ON ASA, statin.       5. HPL:  On  crestor.       6. BRIDGET: ON CPAP     7. HTN:  on BB, follow HR and BP.   25mg on the BB.      Patient has been instructed and agrees to call our office with any issues or other concerns related to their cardiac condition(s) and/or complaint(s).        Return in about 6 months (around 12/4/2025).       Rivera Lazo, DO  6/4/2025

## (undated) DEVICE — STERILE SYNTHETIC POLYISOPRENE POWDER-FREE SURGICAL GLOVES WITH HYDROGEL COATING, SMOOTH FINISH, STRAIGHT FINGER: Brand: PROTEXIS

## (undated) DEVICE — 3M™ TEGADERM™ TRANSPARENT FILM DRESSING FRAME STYLE, 1626W, 4 IN X 4-3/4 IN (10 CM X 12 CM), 50/CT 4CT/CASE: Brand: 3M™ TEGADERM™

## (undated) DEVICE — 450 ML BOTTLE OF 0.05% CHLORHEXIDINE GLUCONATE IN 99.95% STERILE WATER FOR IRRIGATION, USP AND APPLICATOR.: Brand: IRRISEPT ANTIMICROBIAL WOUND LAVAGE

## (undated) DEVICE — KIT TRK HIP PROC VIZADISC

## (undated) DEVICE — (D)PREP SKN CHLRAPRP APPL 26ML -- CONVERT TO ITEM 371833

## (undated) DEVICE — SOLUTION IV 1000ML 0.9% SOD CHL

## (undated) DEVICE — BIT DRILL SINGLE USE

## (undated) DEVICE — ZIMMER® STERILE DISPOSABLE TOURNIQUET CUFF WITH PLC, DUAL PORT, SINGLE BLADDER, 18 IN. (46 CM)

## (undated) DEVICE — GLOVE ORANGE PI 8   MSG9080

## (undated) DEVICE — SUTURE MONOCRYL SZ 2-0 L27IN ABSRB UD CP-1 1 L36MM 1/2 CIR REV Y266H

## (undated) DEVICE — DERMABOND SKIN ADH 0.7ML -- DERMABOND ADVANCED 12/BX

## (undated) DEVICE — 1010 S-DRAPE TOWEL DRAPE 10/BX: Brand: STERI-DRAPE™

## (undated) DEVICE — HOOD: Brand: T7PLUS

## (undated) DEVICE — SUTURE ABSORBABLE MONOFILAMENT 1 CTX 36 CM 48 MM VIO PDS +

## (undated) DEVICE — HOOD: Brand: FLYTE

## (undated) DEVICE — KIT INT FIX FEM TIB CKPT MAKOPLASTY

## (undated) DEVICE — GLOVE SURG SZ 8 L12IN FNGR THK79MIL GRN LTX FREE

## (undated) DEVICE — DRAPE,TOP,102X53,STERILE: Brand: MEDLINE

## (undated) DEVICE — SOLUTION IRRIG 1000ML STRL H2O USP PLAS POUR BTL

## (undated) DEVICE — KIT DRP FOR RIO ROBOTIC ARM ASST SYS

## (undated) DEVICE — SUTURE STRATAFIX SZ 3-0 L30CM NONABSORBABLE UD L19MM PS-2 SXMP2B408

## (undated) DEVICE — SUTURE VCRL RAPIDE SZ 4-0 L18IN ABSRB UD L19MM PS-2 1/2 CIR VR496

## (undated) DEVICE — SOLUTION IRRIG 3000ML 0.9% SOD CHL USP UROMATIC PLAS CONT

## (undated) DEVICE — BIPOLAR SEALER 23-112-1 AQM 6.0: Brand: AQUAMANTYS ®

## (undated) DEVICE — BANDAGE COMPR W1INXL5YD FOAM COHESIVE QUIK STK SELF ADH SFT

## (undated) DEVICE — REAMER SURG OD54MM S STL ACET SPHR CUTTINGEDGE

## (undated) DEVICE — SURGICAL PROCEDURE PACK BASIC ST FRANCIS

## (undated) DEVICE — GLOVE SURG SZ 65 THK91MIL LTX FREE SYN POLYISOPRENE

## (undated) DEVICE — BNDG ELAS ESMARK 4INX12FT LF -- STRL

## (undated) DEVICE — SOLUTION IRRIG 1000ML 0.9% SOD CHL USP POUR PLAS BTL

## (undated) DEVICE — AMD ANTIMICROBIAL GAUZE SPONGES,12 PLY USP TYPE VII, 0.2% POLYHEXAMETHYLENE BIGUANIDE HCI (PHMB): Brand: CURITY

## (undated) DEVICE — COVER,MAYO STAND,XL,STERILE: Brand: MEDLINE

## (undated) DEVICE — STERILE PVP: Brand: MEDLINE INDUSTRIES, INC.

## (undated) DEVICE — SOLUTION IV 250ML 0.9% SOD CHL PH 5 INJ USP VIAFLX PLAS

## (undated) DEVICE — REM POLYHESIVE ADULT PATIENT RETURN ELECTRODE: Brand: VALLEYLAB

## (undated) DEVICE — SUTURE FIBERWIRE SZ 2 W/ TAPERED NEEDLE BLUE L38IN NONABSORB BLU L26.5MM 1/2 CIRCLE AR7200

## (undated) DEVICE — BUTTON SWITCH PENCIL BLADE ELECTRODE, HOLSTER: Brand: EDGE

## (undated) DEVICE — PIN BNE FIX TEMP L170MM DIA4MM MAKO

## (undated) DEVICE — GLOVE SURG SZ 65 L12IN FNGR THK79MIL GRN LTX FREE

## (undated) DEVICE — SUTURE ETHLN SZ 4-0 L18IN NONABSORBABLE BLK L19MM PS-2 3/8 1667H

## (undated) DEVICE — GOWN,REINFORCED,POLY,AURORA,XXLARGE,STR: Brand: MEDLINE

## (undated) DEVICE — STRETCH BANDAGE ROLL: Brand: DERMACEA

## (undated) DEVICE — TOTAL HIP DR WATSON: Brand: MEDLINE INDUSTRIES, INC.